# Patient Record
Sex: FEMALE | Race: BLACK OR AFRICAN AMERICAN
[De-identification: names, ages, dates, MRNs, and addresses within clinical notes are randomized per-mention and may not be internally consistent; named-entity substitution may affect disease eponyms.]

---

## 2018-02-05 ENCOUNTER — HOSPITAL ENCOUNTER (INPATIENT)
Dept: HOSPITAL 68 - ERH | Age: 73
LOS: 3 days | DRG: 377 | End: 2018-02-08
Attending: INTERNAL MEDICINE | Admitting: INTERNAL MEDICINE
Payer: COMMERCIAL

## 2018-02-05 VITALS — HEIGHT: 66 IN | BODY MASS INDEX: 28.37 KG/M2 | WEIGHT: 176.5 LBS

## 2018-02-05 VITALS — SYSTOLIC BLOOD PRESSURE: 156 MMHG | DIASTOLIC BLOOD PRESSURE: 78 MMHG

## 2018-02-05 VITALS — SYSTOLIC BLOOD PRESSURE: 160 MMHG | DIASTOLIC BLOOD PRESSURE: 90 MMHG

## 2018-02-05 DIAGNOSIS — Z79.84: ICD-10-CM

## 2018-02-05 DIAGNOSIS — I12.0: ICD-10-CM

## 2018-02-05 DIAGNOSIS — E05.90: ICD-10-CM

## 2018-02-05 DIAGNOSIS — I21.A1: ICD-10-CM

## 2018-02-05 DIAGNOSIS — E11.22: ICD-10-CM

## 2018-02-05 DIAGNOSIS — K29.71: Primary | ICD-10-CM

## 2018-02-05 DIAGNOSIS — E78.5: ICD-10-CM

## 2018-02-05 DIAGNOSIS — N18.6: ICD-10-CM

## 2018-02-05 DIAGNOSIS — Z99.2: ICD-10-CM

## 2018-02-05 LAB
APTT BLD: 37 SEC (ref 25–37)
ERYTHROCYTE [DISTWIDTH] IN BLOOD BY AUTOMATED COUNT: 17.1 % (ref 11.5–14.5)
HCT VFR BLD CALC: 37.8 % (ref 37–47)
MCH RBC QN AUTO: 28.5 PG (ref 27–31)
MCHC RBC AUTO-ENTMCNC: 32.2 G/DL (ref 33–37)
MCV RBC AUTO: 88.7 FL (ref 81–99)
PROTHROMBIN TIME: 13.9 SEC (ref 9.4–12.5)
RED BLOOD CELL CT: 4.27 /CUMM (ref 4.2–5.4)
WBC # BLD AUTO: 3.8 /CUMM (ref 4.8–10.8)

## 2018-02-05 NOTE — ADMISSION CERTIFICATION
Admission Certification
 
Certification Statement
- As attending physician, I certify that at the time of
- admission, based on clinical presentation, severity of
- symptoms, need for further diagnostic testing and
- therapeutic interventions, and risk of adverse outcomes
- without in-hospital treatment, in my clinical assessment,
- this patient requires an acute hospital stay for a minimum
- of two nights or longer. I have also considered psychsocial
- factors such as support system, advanced age, financial
- issues, cognitive issues, and failed out-patient treatments,
- past re-admission history, safety of patient, and lack of
- compliance as applicable.
Specific rationale supporting this admission is:
The patient presents with black heme positive stool c/w UGI bleed along with 
elevated troponin I level and abnormal EKG. Needs telemetry admission and will 
check serial H/H's, troponin levels. Cardiology and GI evaluations. EGD. IV 
Protonix.

## 2018-02-05 NOTE — CONS- CARDIOLOGY
General Information and HPI
 
Consulting Request
Date of Consult: 18
Requested By:
Hospitalist
 
Reason for Consult:
Positive troponin in a setting of possible GI bleeding
Source of Information: patient, old records
Exam Limitations: poor historian
History of Present Illness:
The patient is a 72-year-old female with end-stage renal disease on dialysis.  
The patient denied to me any known heart disease or a regular cardiologist.  She
presented to the emergency room after complaining of black stools and some 
stools with red blood in them.  Her workup does not show any massive GI bleeding
but a troponin was positive at 0.25 and it was recommended that she be admitted 
for both the GI bleeding and positive troponin.  She denies any chest pain or 
palpitations, dizziness, syncope.  She has chronic shortness of breath which she
claims is a little bit worse.
 
Allergies/Medications
Allergies:
Coded Allergies:
No Known Drug Allergies (Intermediate, NONE 17)
 
Home Med List:
Acetaminophen (Mapap) 325 MG TABLET   2 TAB PO Q8H PRN PAIN  (Reported)
Amlodipine Besylate 5 MG TABLET   1 TAB PO DAILY HEART  (Reported)
Carvedilol 25 MG TABLET   1 TAB PO BID HEART/BP  (Reported)
Glimepiride 1 MG TABLET   1 TAB PO DAILY AC DM  (Reported)
Levothyroxine Sodium 25 MCG TABLET   1 TAB PO DAILY THYROID  (Reported)
Losartan Potassium (Cozaar) 50 MG TABLET   1 TAB PO DAILY HEART/BP  (Reported)
Melatonin 10 MG TABLET   1 TAB PO QHS SLEEP  (Reported)
Midodrine HCl 5 MG TABLET   1 TAB PO PRN BP  (Reported)
Pravastatin Sodium 40 MG TABLET   1 TAB PO DAILY CHOLESTEROL  (Reported)
Sertraline HCl 50 MG TABLET   1 TAB PO DAILY MENTAL HEALTH  (Reported)
Sevelamer Carbonate (Renvela) 800 MG TABLET   3 TAB PO TID WM RENAL  (Reported)
 
 
Review of Systems
Review of Systems:
No other complaints at this time
 
Past History
 
Travel History
Traveled to [place] past 21 day No
 
Medical History
Neurological: NONE
EENT: NONE
Cardiovascular: hypertension
Gastrointestinal: NONE
Hepatic: NONE
Renal: ESRD on HD
Musculoskeletal: NONE
Psychiatric: NONE
Endocrine: diabetes
 
Surgical History
Surgical History: non-contributory
 
Exam & Diagnostic Data
Vital Signs and I&O
Vital Signs
 
 
Date Time Temp Pulse Resp B/P B/P Pulse O2 O2 Flow FiO2
 
     Mean Ox Delivery Rate 
 
 1358 98.1 100 20 172/102  98 Room Air  
 
 1356      96   
 
 1215 98.0 98 20 210/130  89   
 
 
 Intake & Output
 
 
  1600  08 0000  1600  08 0000
 
Intake Total 0     
 
Output Total      
 
Balance 0     
 
       
 
Intake, Oral 0     
 
 
 
Physical Exam:
Elderly-appearing black female in no acute distress
HEENT exam normal
Neck veins not distended
Carotids normal
Chest reveals some mild expiratory wheezing
Heart regular rhythm no murmurs
Extremities no edema
Labs/Daniel Results:
 Laboratory Tests
 
 
 
 
 1220
 
Chemistry 
 
  Sodium (137 - 145 mmol/L) 142
 
  Potassium (3.5 - 5.1 mmol/L) 3.8
 
  Chloride (98 - 107 mmol/L) 103
 
  Carbon Dioxide (22 - 30 mmol/L) 21  L
 
  Anion Gap (5 - 16) 18  H
 
  BUN (7 - 17 mg/dL) 15
 
  Creatinine (0.5 - 1.0 mg/dL) 6.5 *H
 
  Estimated GFR (>60 ml/min) 6  L
 
  BUN/Creatinine Ratio (7 - 25 %) 2.3  L
 
  Glucose (65 - 99 mg/dL) 127  H
 
  Calcium (8.4 - 10.2 mg/dL) 9.1
 
  Total Bilirubin (0.2 - 1.3 mg/dL) 1.2
 
  AST (14 - 36 U/L) 39  H
 
  ALT (9 - 52 U/L) 32
 
  Alkaline Phosphatase (<127 U/L) 81
 
  Troponin I (< 0.11 ng/ml) 0.25 *H
 
  Total Protein (6.3 - 8.2 g/dL) 7.3
 
  Albumin (3.5 - 5.0 g/dL) 4.0
 
  Globulin (1.9 - 4.2 gm/dL) 3.3
 
  Albumin/Globulin Ratio (1.1 - 2.2 %) 1.2
 
Coagulation 
 
  PT (9.4 - 12.5 SEC) 13.9  H
 
  INR (0.90 - 1.19) 1.33  H
 
  APTT (25 - 37 SEC) 37
 
Hematology 
 
  CBC w Diff MAN DIFF ORDERED
 
  WBC (4.8 - 10.8 /CUMM) 3.8  L
 
  RBC (4.20 - 5.40 /CUMM) 4.27
 
  Hgb (12.0 - 16.0 G/DL) 12.2
 
  Hct (37 - 47 %) 37.8
 
  MCV (81.0 - 99.0 FL) 88.7
 
  MCH (27.0 - 31.0 PG) 28.5
 
  MCHC (33.0 - 37.0 G/DL) 32.2  L
 
  RDW (11.5 - 14.5 %) 17.1  H
 
  Plt Count (/CUMM)   
 
  Segmented Neutrophils (42.2 - 75.2 %) 53
 
  Band Neutrophils (0.0 - 5.0 %) 1
 
  Lymphocytes (20.5 - 51.1 %) 36
 
  Monocytes (1.7 - 9.3 %) 10  H
 
  Anisocytosis 1+
 
 
 
 
Diagnostic Data
EKG Results
Sinus tachycardia, rate 102, probable LVH, left axis deviation, nonspecific T-
wave changes.
CXR Results
PATIENT: COOKS,LUCY  MEDICAL RECORD NO: [id number]
PRESENT AGE: 72  PATIENT ACCOUNT NO: [id number]
: 47  LOCATION: [facility]
ORDERING PHYSICIAN: Cristina ÁLVAREZ  
 
  SERVICE DATE: 
EXAM TYPE: RAD - XRY-PORTABLE CHEST XRAY
 
EXAMINATION:
XR PORTABLE CHEST
 
CLINICAL INFORMATION:
Shortness of breath
 
COMPARISON:
2018
 
TECHNIQUE:
Portable frontal view of the chest was obtained.
 
FINDINGS:
Lung volumes are low, further decreased from the prior study. There is hazy
opacity at the lung bases, likely a combination of atelectasis and overlying
soft tissues. Persistent cardiomegaly. Right axillary vascular stent. There
is pulmonary venous hypertension. No acute osseous abnormality seen.
 
IMPRESSION:
Cardiomegaly and pulmonary venous hypertension.
 
Low lung volumes with bibasilar atelectasis but no dense focal consolidation.
 
 
DICTATED BY: Rich Vanessa MD 
DATE/TIME DICTATED:18
:RAD.FLORES 
DATE/TIME TRANSCRIBED:18
 
CONFIDENTIAL, DO NOT COPY WITHOUT APPROPRIATE AUTHORIZATION.
 
 <Electronically signed in Other Vendor System>                                 
          
                                           SIGNED BY: Rich Vanessa MD  2349
 
 
 
 
Assessment/Plan
Assessment/Plan
The patient is a 72-year-old female who comes in with complaints of possible GI 
bleeding with black stools.  She is found to have some wheezing on examination. 
She also has a positive troponin of 0.25.  She has an abnormal EKG but it is 
probably consistent with her history of hypertension and end-stage renal 
disease.  I recommend the bring her in for serial EKGs and troponins and 
obtaining an echocardiogram.  I think this is most likely a type II myocardial 
infarction, i.e. demand ischemia, but we will reevaluate her after all the 
testing is in.  There are no plans for aggressive or interventional cardiac 
evaluation at this time.
 
 
Consult Acknowledgment
- Thank you for your consult request.

## 2018-02-05 NOTE — ED GENERAL ADULT
History of Present Illness
 
General
Chief Complaint: General Adult
Stated Complaint: SIB MD FOR "BLACK STOOL"
Source: patient, family
Exam Limitations: no limitations
 
Vital Signs & Intake/Output
Vital Signs & Intake/Output
 Vital Signs
 
 
Date Time Temp Pulse Resp B/P B/P Pulse O2 O2 Flow FiO2
 
     Mean Ox Delivery Rate 
 
 1009  60  120/64     
 
 1008  60  120/64     
 
 1007  60  120/64     
 
 0713 98.2 60 20 120/64  94 Room Air  
 
 2245  96  160/90     
 
 2244  96  160/90     
 
 2244 98.2 96 24 160/90  95   
 
/ 1802 97.8 87 28 156/78  95   
 
 1733 98.2 96 16 168/88  98 Room Air Room Air 
 
 1548  98 16 170/100  98 Room Air Room Air 
 
 1358 98.1 100 20 172/102  98 Room Air  
 
 1356      96   
 
 
 ED Intake and Output
 
 
  0000  1200
 
Intake Total 400 
 
Output Total  
 
Balance 400 
 
   
 
Intake,  
 
Intake, Oral 0 
 
Patient 185 lb 
 
Weight  
 
 
 
Allergies
Coded Allergies:
No Known Drug Allergies (Intermediate, NONE 17)
 
Reconcile Medications
Acetaminophen (Mapap) 325 MG TABLET   2 TAB PO Q8H PRN PAIN  (Reported)
Amlodipine Besylate 5 MG TABLET   1 TAB PO DAILY HEART  (Reported)
Carvedilol 25 MG TABLET   1 TAB PO BID HEART/BP  (Reported)
Glimepiride 1 MG TABLET   1 TAB PO DAILY AC DM  (Reported)
Levothyroxine Sodium 25 MCG TABLET   1 TAB PO DAILY THYROID  (Reported)
Losartan Potassium (Cozaar) 50 MG TABLET   1 TAB PO DAILY HEART/BP  (Reported)
Melatonin 10 MG TABLET   1 TAB PO QHS SLEEP  (Reported)
Midodrine HCl 5 MG TABLET   1 TAB PO PRN BP  (Reported)
Pravastatin Sodium 40 MG TABLET   1 TAB PO DAILY CHOLESTEROL  (Reported)
Sertraline HCl 50 MG TABLET   1 TAB PO DAILY MENTAL HEALTH  (Reported)
Sevelamer Carbonate (Renvela) 800 MG TABLET   3 TAB PO TID WM RENAL  (Reported)
 
Triage Note:
PER PT SINCE YESTERDAY BLACK STOOLS AND
 LIGHTHEADED  NO CO NAUSEA PER PT NOT ON BLOOD
 THINNERS
Triage Nurses Notes Reviewed? yes
Onset: Gradual
Duration: day(s): (2)
Timing: no prior history
Injury Environment: home
Severity: moderate
No Modifying Factors: none
Associated Symptoms: SOB
HPI:
Patient is a 72-year-old female with chronic renal failure, hypertension and 
diabetes presenting to the emergency department to complaint of black stools 3 
episodes since yesterday evening.  She called her primary care physician who 
instructed her to come to the emergency department for evaluation.  She reports 
associated lightheadedness.  Positive shortness of breath which according to the
patient and  has been "chronic".  Shortness of breath has been going on 
for "months".  Worse with exertion.  Patient denies any chest pain or 
palpitations.  Patient denying any changes in medications.  Her last dialysis 
appointment was 2 days ago.  She is due for dialysis tomorrow.  Denies any 
weakness or fatigue.  No history of similar symptoms in the past.  She has never
had a colonoscopy or endoscopy in the past.  Denies use of Motrin.  No nausea or
abdominal pain.
(Cristina Marley)
 
Past History
 
Travel History
Traveled to [place] past 21 day No
 
Medical History
Any Pertinent Medical History? see below for history
Neurological: NONE
EENT: NONE
Cardiovascular: hypertension
Gastrointestinal: NONE
Hepatic: NONE
Renal: ESRD on HD
Musculoskeletal: NONE
Psychiatric: NONE
Endocrine: diabetes
 
Surgical History
Surgical History: non-contributory
 
Psychosocial History
What is your primary language English
Tobacco Use: Never used
 
Family History
Hx Contributory? No
(Cristina Marley)
 
Review of Systems
 
Review of Systems
Constitutional:
Reports: no symptoms. 
Comments
Review of systems: See HPI, All other systems negative.
Constitutional, no chills fever or weight loss
HEENT: No visual changes no sore throat no congestion
Cardiovascular: No chest pain ,palpitation , orthopnea or ankle swelling
Skin, no jaundice no rashes
Respiratory: No dyspnea cough sputum or hemoptysis
GI: No nausea no vomiting
: No dysuria No hematuria
Muscle skeletal: no back pain, no neck pain,
Neurologic: No numbness no confusion, no headaches
Psych: No stress anxiety or depression,.
Heme/endocrine: No bruising no bleeding no polyuria or polydipsia
Immunology: No splenectomy or history of AIDS
(Cristina Marley)
 
Physical Exam
 
Physical Exam
General Appearance: well developed/nourished, no apparent distress, alert, awake
, comfortable
Comments:
Well-developed well-nourished person in no acute distress
HEENT:  Pupils equally round and reactive to light and accommodation. ? CATARACT
NOTED BILATERALLY.Nose is atraumatic. External auditory canal and Tympanic 
membranes clear. Pharynx normal. No swelling or edema.  Dry oral mucosa.
Neck: Supple, no lymphadenopathy, normal range of motion without pain or 
tenderness
Back: Nontender, no CVA tenderness. Full range of motion
Cardiovascular: Regular rate and rhythms no murmurs rubs or gallops, normal JVP
Respiratory: Chest nontender. No respiratory distress.breath sounds clear to 
auscultation bilaterally
Abdomen: Soft, nontender nondistended, no appreciable organomegaly. Normal bowel
sounds. No ascites,, no rebound or guarding.
Rectal: Green stool, trace positive guaiac.  External hemorrhoid noted in the 3 
o'clock position approximately 1 cm in size, nonthrombosed.
Extremity: No edema, no calf tenderness to palpation, normal and equal pulses.
Neuro: Alert oriented x3, motor sensory normal, cranial nerves II through XII 
grossly intact.  Cerebellar testing is unremarkable.
Skin: No appreciable rash on exposed skin, skin is warm and dry.
Psych: Mood and affect is normal, memory and judgment is normal.
 
Core Measures
ACS in differential dx? Yes
CVA/TIA Diagnosis: No
Sepsis Present: No
Sepsis Focused Exam Completed? No
(Adela ÁLVAREZ,Cristina)
 
Progress
Differential Diagnoses
I considered the following diagnoses in my evaluation of the patient:  Upper GI 
bleed, lower GI bleed, diverticulitis, electrolyte abnormality, symptomatic 
anemia, orthostatic hypotension, ACS, ENTERITIS
 
Plan of Care:
 Orders
 
 
Procedure Date/time Status
 
Nothing by Mouth  B Active
 
CBC WITHOUT DIFFERENTIAL  0600 Active
 
BASIC ELECTROLYTES PLUS BUN&CR  0600 Complete
 
TROPONIN LEVEL  0500 Complete
 
EKG  0500 Active
 
ECHOCARDIOGRAM   UNK Active
 
Renal Dialysis Diet  D Complete
 
TROPONIN LEVEL  2300 Complete
 
EKG  2300 Active
 
Vital Signs  180 Complete
 
Teach/Educate  180 Active
 
Pain Treatment and Response  180 Active
 
Nutritional Intake, Monitor  180 Active
 
Isolation  180 Active
 
Intake & Output  180 Complete
 
Patient Care Conference  180 Active
 
Activity/Ambulation  180 Active
 
Pathway - chart  175 Active
 
Weight  175 Active
 
TROPONIN LEVEL  175 Complete
 
EKG  1758 Active
 
Code Status  1758 Active
 
ED Holding Orders  1506 Active
 
Admit to inpatient  1506 Active
 
Code Status  1506 Complete
 
Patient Data  1501 Active
 
MISTAKE  1441 Active
 
Intake & Output  1356 Active
 
Telemetry/Cardiac Monitor  1354 Active
 
House Staff   UNK Active
 
VTE Mechanical Prophylaxis   UNK Active
 
Vital Signs   UNK Active
 
Intake & Output   UNK Complete
 
Hemoccult   UNK Active
 
FingerStick- Glucose   UNK Active
 
 
 Current Medications
 
 
  Sig/Misty Start time  Last
 
Medication Dose  Stop Time Status Admin
 
Pravastatin Sodium 20 MG 1700  1700 AC 
 
(Pravachol)     
 
Epoetin Jw 4,000 UNIT PER PROTOCL PRN  1030 AC 
 
(Epogen Inj 4000      
 
(DIALYSIS PATIENT))     
 
Paricalcitol 1 MCG PER PROTOCL PRN  1030 AC 
 
(Zemplar Inj. 2MCG/     
 
ML)     
 
Losartan Potassium 50 MG DAILY  1000 AC 
 
(Cozaar)     1007
 
Insulin Human Regular 0 Q6  2359 AC 
 
(NovoLIN R)     
 
Carvedilol 25 MG BID  2200 AC 
 
(Coreg)     1009
 
Melatonin 10 MG AT BEDTIME  220 AC 
 
(Melatonin)     2244
 
Acetaminophen 650 MG Q6P PRN  1800 AC 
 
(Tylenol)     
 
Acetaminophen 650 MG Q8H PRN  1800 CAN 
 
(Tylenol)     
 
Dextrose/Sodium  1,000 ML Q20H / 1800 AC 
 
Chloride    0959  1839
 
(D5W-1/2 Normal      
 
Saline 1000ML)     
 
Levothyroxine Sodium 0.025 MG DAILY  1800 AC 
 
(Synthroid)     1008
 
Midodrine 5 MG DAILY PRN  1800 AC 
 
(Pro-Amatine)     
 
Sevelamer Carbonate 2,400 MG WM  1800 AC 
 
(Renvela)     1857
 
Amlodipine Besylate 5 MG DAILY  1759 AC 
 
(Norvasc)     1008
 
Sertraline HCl 50 MG DAILY  1759 AC 
 
(Zoloft)     1008
 
Pantoprazole Sodium 40 MG DAILY  1756 AC 
 
(Protonix)     1007
 
 
 Laboratory Tests
 
 
 
18 1005:
WBC Pending, RBC Pending, Hgb Pending, Hct Pending, MCV Pending, MCH Pending, 
MCHC Pending, RDW Pending, Plt Count Pending, MPV Pending, Gran % Pending, 
Lymphocytes % Pending, Monocytes % Pending, Eosinophils % Pending, Basophils % 
Pending, Absolute Granulocytes Pending, Absolute Lymphocytes Pending, Absolute 
Monocytes Pending, Absolute Eosinophils Pending, Absolute Basophils Pending
 
18 0515:
Troponin I 0.27 *H
 
18 0515:
Anion Gap 16, Estimated GFR 5  L, BUN/Creatinine Ratio 2.3  L
 
18 2315:
Troponin I 0.30 *H
 
18 1836:
Troponin I 0.25 *H
 
 
D/W Covering cardiology, Dr. Mascorro, recommending we trend troponins and EKGs.
 Hold off on anticoagulation.  Likely demand ischemia secondary to elevated 
creatinine.  Patient will be admitted to medicine to telemetry for elevated 
troponin.
Diagnostic Imaging:
Viewed by Me: Radiology Read.  Discussed w/RAD: Radiology Read. 
Radiology Impression: PATIENT: COOKS,LUCY  MEDICAL RECORD NO: [id number] PRESENT AGE
: 72  PATIENT ACCOUNT NO: [id number] : 47  LOCATION: [facility] ORDERING 
PHYSICIAN: Cristina ÁLVAREZ     SERVICE DATE:  EXAM TYPE: RAD - 
XRY-PORTABLE CHEST XRAY EXAMINATION: XR PORTABLE CHEST CLINICAL INFORMATION: 
Shortness of breath COMPARISON: 2018 TECHNIQUE: Portable frontal view of 
the chest was obtained. FINDINGS: Lung volumes are low, further decreased from 
the prior study. There is hazy opacity at the lung bases, likely a combination 
of atelectasis and overlying soft tissues. Persistent cardiomegaly. Right 
axillary vascular stent. There is pulmonary venous hypertension. No acute 
osseous abnormality seen. IMPRESSION: Cardiomegaly and pulmonary venous 
hypertension. Low lung volumes with bibasilar atelectasis but no dense focal 
consolidation. DICTATED BY: Rich Vanessa MD  DATE/TIME DICTATED:18 :RAD.FLORES  DATE/TIME TRANSCRIBED:18 
CONFIDENTIAL, DO NOT COPY WITHOUT APPROPRIATE AUTHORIZATION.  <Electronically 
signed in Other Vendor System>                           , PATIENT: COOKS,LUCY  
MEDICAL RECORD NO: [id number] PRESENT AGE: 72  PATIENT ACCOUNT NO: [id number] :   LOCATION: [facility] ORDERING PHYSICIAN: Cristina ÁLVAREZ     SERVICE DATE: 
 EXAM TYPE: RAD - XRY-PORTABLE CHEST XRAY EXAMINATION: XR PORTABLE 
CHEST CLINICAL INFORMATION: Shortness of breath COMPARISON: 2018 TECHNIQUE
: Portable frontal view of the chest was obtained. FINDINGS: Lung volumes are 
low, further decreased from the prior study. There is hazy opacity at the lung 
bases, likely a combination of atelectasis and overlying soft tissues. 
Persistent cardiomegaly. Right axillary vascular stent. There is pulmonary 
venous hypertension. No acute osseous abnormality seen. IMPRESSION: Cardiomegaly
and pulmonary venous hypertension. Low lung volumes with bibasilar atelectasis 
but no dense focal consolidation. DICTATED BY: Rich Vanessa MD  DATE/TIME 
DICTATED:18 :RAD.FLORES  DATE/TIME TRANSCRIBED:1435 CONFIDENTIAL, DO NOT COPY WITHOUT APPROPRIATE AUTHORIZATION.  <
Electronically signed in Other Vendor System>                                   
                                                    SIGNED BY: Rich Vanessa MD 18 1440
Initial ED EKG: SINUS TACHY
(Cristina Marley)
 
Departure
 
Departure
Time of Disposition: 1547
Disposition: STILL A PATIENT
Condition: Stable
Clinical Impression
Primary Impression: Elevated troponin
Secondary Impressions: 
Guaiac positive stools
 
Hypertension
Qualifiers:  Hypertension type: essential hypertension Qualified Code: I10 - 
Essential (primary) hypertension
 
Referrals:
Ranulfo Rossi MD (PCP/Family)
 
Departure Forms:
Customer Survey
General Discharge Information
 
Admission Note
Spoke With:
Walter Moffett MD
Documentation of Exam:
Documentation of any treatments & extenuating circumstances including Concerns 
Regarding Discharge (functional status, medication knowledge or non-compliance, 
living conditions, etc.) that warrant an admission rather than observation:  
Patient requiring telemetry admission for positive troponin, serial EKGs and 
serial troponins, cardiology consultation, patient will require routine dialysis
scheduled for tomorrow, GI consultation, may need endoscopy and colonoscopy, 
TrEND H&H.
(Cristina Marley)
 
PA/NP Co-Sign Statement
Statement:
ED Attending supervision documentation-
 
[X] I saw and evaluated the patient. I have also reviewed all the pertinent lab 
results and diagnostic results. I agree with the findings and the plan of care 
as documented in the PA's/NP's documentation. 
 
[X] I have reviewed the ED Record and agree with the PA's/NP's documentation.
 
[] Additions or exceptions (if any) to the PAs/NP's note and plan are 
summarized below:
[]
 
(Nirav GONZALES,Eveline)
 
Critical Care Note
 
Critical Care Note
Critical Care Time: 30-74 min
(Adela ÁLVAREZ,Cristina)

## 2018-02-05 NOTE — HISTORY & PHYSICAL
Ariadna Abraham MD,[facility] 02/05/18 1516:
General Information and HPI
MD Statement:
I have seen and personally examined COOKS,LUCY and documented this H&P.
 
The patient is a 72 year old F who presented with a patient stated chief 
complaint of [black stool].
 
Source of Information: patient, family
History of Present Illness:
Patient is a 72-year-old female with PMH of ESRD on HD (Tues/Thurs/Sat), HTN, DM
, hypothyroidism presented to ED was referred from her PCPs office of evalaution
of black stool and lightheadedness.  Son and  were present at the bedside
but had little information regarding her health condition.
 
According to patient she was in usual state of health until last night that she 
had one black bowel movement.  She again had another black bowel movement this 
morning and planned to go to her PCP (she noted Dr. Gastelum); her PCP then 
referred her to hospital for further evaluation.  She also reported 
lightheadedness, shortness of breathing in walking short distances, but denied 
any chest pain, nausea or vomiting.  She denied taking any anticoagulation or 
aspirin and noted she skipped some of her medication.
 
Patient lives with her , her granddaughter takes care of her medication, 
and denied smoking or drinking alcohol.  She reported a fall 2 weeks ago, after 
which she recommended to use a crane for ambulation.
 
Allergies/Medications
Allergies:
Coded Allergies:
No Known Drug Allergies (Intermediate, NONE 11/02/17)
 
Home Med list
Acetaminophen (Mapap) 325 MG TABLET   2 TAB PO Q8H PRN PAIN  (Reported)
Amlodipine Besylate 5 MG TABLET   1 TAB PO DAILY HEART  (Reported)
Carvedilol 25 MG TABLET   1 TAB PO BID HEART/BP  (Reported)
Glimepiride 1 MG TABLET   1 TAB PO DAILY AC DM  (Reported)
Levothyroxine Sodium 25 MCG TABLET   1 TAB PO DAILY THYROID  (Reported)
Losartan Potassium (Cozaar) 50 MG TABLET   1 TAB PO DAILY HEART/BP  (Reported)
Melatonin 10 MG TABLET   1 TAB PO QHS SLEEP  (Reported)
Midodrine HCl 5 MG TABLET   1 TAB PO PRN BP  (Reported)
Pravastatin Sodium 40 MG TABLET   1 TAB PO DAILY CHOLESTEROL  (Reported)
Sertraline HCl 50 MG TABLET   1 TAB PO DAILY MENTAL HEALTH  (Reported)
Sevelamer Carbonate (Renvela) 800 MG TABLET   3 TAB PO TID WM RENAL  (Reported)
 
 
Past History
 
Travel History
Traveled to [place] past 21 day No
 
Medical History
Neurological: NONE
EENT: NONE
Cardiovascular: hypertension
Gastrointestinal: NONE
Hepatic: NONE
Renal: ESRD on HD
Musculoskeletal: NONE
Psychiatric: NONE
Endocrine: diabetes
 
Surgical History
Surgical History: non-contributory
 
Review of Systems
 
Review of Systems
Constitutional:
Reports: see HPI. 
 
Exam & Diagnostic Data
Last 24 Hrs of Vital Signs/I&O
 Vital Signs
 
 
Date Time Temp Pulse Resp B/P B/P Pulse O2 O2 Flow FiO2
 
     Mean Ox Delivery Rate 
 
02/05 1802 97.8 87 28 156/78  95   
 
02/05 1733 98.2 96 16 168/88  98 Room Air Room Air 
 
02/05 1548  98 16 170/100  98 Room Air Room Air 
 
02/05 1358 98.1 100 20 172/102  98 Room Air  
 
02/05 1356      96   
 
02/05 1215 98.0 98 20 210/130  89   
 
 
 Intake & Output
 
 
 02/05 1600 02/05 0800 02/05 0000
 
Intake Total 0  
 
Output Total   
 
Balance 0  
 
    
 
Intake, Oral 0  
 
 
 
 
Physical Exam
General Appearance Alert, Oriented X3, Cooperative, No Acute Distress
Skin No Significant Lesion
Skin Temp/Moisture Exam: Warm/Dry
Sepsis Skin Exam (color): Normal for Ethnicity
HEENT Atraumatic, EOMI, Mucous Membr. moist/pink
Cardiovascular Regular Rate, Normal S1, Normal S2, No Murmurs
Lungs Clear to Auscultation
Abdomen Soft, mild epigastric tenderness in deep palpation
Neurological Normal Speech, Strength at 5/5 X4 Ext
Extremities No Edema, hemodialysis fistula present in right arm
Last 24 Hrs of Labs/Daniel:
 Laboratory Tests
 
02/05/18 1836:
Troponin I Pending
 
02/05/18 1220:
Anion Gap 18  H, Estimated GFR 6  L, BUN/Creatinine Ratio 2.3  L, Glucose 127  H
, Calcium 9.1, Total Bilirubin 1.2, AST 39  H, ALT 32, Alkaline Phosphatase 81, 
Troponin I 0.25 *H, Total Protein 7.3, Albumin 4.0, Globulin 3.3, Albumin/
Globulin Ratio 1.2, PT 13.9  H, INR 1.33  H, APTT 37, CBC w Diff MAN DIFF 
ORDERED, RBC 4.27, MCV 88.7, MCH 28.5, MCHC 32.2  L, RDW 17.1  H, Segmented 
Neutrophils 53, Band Neutrophils 1, Lymphocytes 36, Monocytes 10  H, 
Anisocytosis 1+
 
 
Assessment/Plan
Assessment:
72 Y F, presented with black BM x2 and lightheadedness
 
PMH of ESRD on HD (Tues/Thurs/Sat), HTN, DM, hypothyroidism
 
VS, Ph Ex at admission:
 over 130, NV 98, RR 20, no fever, later /102
Labs at admission:
C BC: Hgb 12.2, WBC 3.8, PLT was not contacted, INR 1.3, BEP, CR 6.5, AST 39, 
troponin 0.25
Imagings at admission:
CXR:
Cardiomegaly and pulmonary venous hypertension.
Low lung volumes with bibasilar atelectasis but no dense focal consolidation.
 
Patient was admitted to telemetry floor for management of following conditions:
 
GIB
Likely upper GI bleeding considering black bowel movement which indicates 
presence of blood for several hours.  However patient had no hematemesis that is
relatively indicative of upper GI bleeding.  At this point the source could be 
upper or lower GI bleeding as clinical presentation doesn't point to the source,
yet upper is more likely as noted above.
We will admit patient to telemetry floor and is scheduled for an upper GI 
endoscopy tomorrow morning.
-Admit patient to telemetry floor
-Vital sign, I/O
-2 large IV line all the time
-CBC in AM
- NPO tonight
- GI consult for possible endoscopy tomorrow
 
ESRD on HD
Patient has a history of end-stage renal disease on hemodialysis on Tuesday, 
Thursday, Saturday.  We will consult nephrology and continue the same schedule.
-Nephrology consult
-Hemodialysis tomorrow
 
Increased troponins
This could be secondary to an decisional disease, however we will continue to 
trend down to rule out ACS.  Patient doesn't have any symptoms at this point.
-Serial EKG and troponin
-Cardiology consult
-Echo
 
Full code
Dialysis diet
DVT PPX: ALPS
  
 
As Ranked By This Provider
Problem List:
 1. ESRD (end stage renal disease)
 
 2. GIB (gastrointestinal bleeding)
 
 
Core Measures/Misc (9/17)
 
Acute Coronary Syndrome
ACS Diagnosis: No
 
Congestive Heart Failure
Congestive Heart Failure Diagnosis No
 
Cerebrovascular Accident
CVA/TIA Diagnosis: No
 
VTE (View Protocol)
VTE Risk Factors Age>40
No Mechanical VTE Prophylaxis d/t N/A MechProphylax Ordered
No VTE Pharm Prophylaxis d/t Medical Contraindication
 
Sepsis (View protocol)
Sepsis Present: No
 
 
Walter Moffett MD 02/05/18 1824:
Attending MD Review Statement
 
Attending Statement
Attending MD Statement: examined this patient, discuss w/resident/PA/NP, agreed 
w/resident/PA/NP, discussed with family, reviewed EMR data (avail), reviewed 
images, amended to note
Attending Assessment/Plan:
The patient is a 73 yo female with h/o ESRD on dialysis (qTu/Th/Sa), HTN,  DM2, 
HL, & hypothyroid who presented in the ED on the day of admission with noted 
"black stool". She also noted some lightheadedness and dyspnea (worse with 
exertion). She denied any chest pain, vomiting , nausea, hematemesis, fevers, 
abdominal pain, or other symptoms. No h/o bleeding previously. Has had 
colonoscopy before (no EGD).
 
Physical Exam:
VS: T 98.0, P 98, R 20, /130-172/102, PO 98% RA
HEENT: eyes- PERRLA, EOMI
           jane- moist mucosa, no lesions
Neck: no adenopathy, JVD, bruits
Chest: minimal expiratory wheeze
Cor: RRR nl S1, S2 w/o murm
Abd: BS+, soft, NT, - HSM
Ext: no edema, pulses 1+
Neuro: alert & oriented x 3, non-focal exam
 
Labs/Tests- as above
 
Impression/Plan:
#Acute Gastrointestinal Bleeding- black stool noted c/w UGI source. Possible 
gastritis, PUD, DU, angiodysplasia, etc. No h/o UGI bleeding in past. 
Plan: Admit to telemetry bed - check serial H/H's, monitor stool for bleeding, 
orthostatics.
       GI consult- Dr. Mascorro. IV PPI/protonix.
       Plan for EGD tomorrow. 
 
#HTN- patient with increased BP noted, however most likely did not take her meds
today.
Plan: Agree to give po Amlodipine with sip of water and add Carvedilol/Losartan 
pending BP.
 
#Abnormal EKG/Elevated Troponin I Level.- has T wave inversions in lateral limb 
leads and NSSTT changes similar to prior tracing. Troponin most likely elevated 
due to chronic renal failure. Need to exclude type II MI.
Plan: Will monitor on telemetry and check serial troponin levels. 
        Cardiology consult with Dr. Mascorro appreciated. 
 
#DM2- on Glimepiride at home.
Plan: Follow glucose levels and sliding scale insulin at present.
 
#Hypothyroid- on Levothyroxine.
Plan: Continue Levothyroxine.
 
#HL- on Pravastatin.
Plan: Hold at present. 
 
#Depression- on Sertraline.
Plan: Hold at present while NPO.
 
 
 
Anand Moralez 02/05/18 1841:
Resident Review Statement
Resident Statement: examined this patient, discussed with intern, agreed with 
intern, discussed with family, reviewed EMR data (avail), reviewed images, 
amended to note
Other Findings:
72-year-old woman with end-stage renal disease on dialysis Tuesday, Thursday and
Saturday, no known heart disease, doesn't see any cardiologist presented to the 
ED after experiencing dark stools since yesterday.  She went to see her primary 
care physician this morning, who advised that the patient goes to the ED.  She 
also experienced some epigastric pain, shortness of breath and lightheadedness/ 
tiredness.  The patient will be admitted to telemetry floor for a positive 
troponin and for further workup of her GI bleeding.  Her EKG was reviewed, which
shows abnormality likely related to her long-standing hypertension.  Obtain 
serial troponins and EKG to rule out a MI, this likely is demand ischemia in the
setting of end-stage renal disease.  For the GI bleed, large-bore IV, careful 
fluid hydration given end-stage renal disease (obtain Nephro consult), GI 
evaluation, IV Protonix.  Sliding scale for diabetes.  Continue with the 
medications.
Full code.  Nothing by mouth.  Only mechanical DVT prophylaxis.

## 2018-02-05 NOTE — RADIOLOGY REPORT
EXAMINATION:
XR PORTABLE CHEST
 
CLINICAL INFORMATION:
Shortness of breath
 
COMPARISON:
01/13/2018
 
TECHNIQUE:
Portable frontal view of the chest was obtained.
 
FINDINGS:
Lung volumes are low, further decreased from the prior study. There is hazy
opacity at the lung bases, likely a combination of atelectasis and overlying
soft tissues. Persistent cardiomegaly. Right axillary vascular stent. There
is pulmonary venous hypertension. No acute osseous abnormality seen.
 
IMPRESSION:
Cardiomegaly and pulmonary venous hypertension.
 
Low lung volumes with bibasilar atelectasis but no dense focal consolidation.

## 2018-02-06 VITALS — SYSTOLIC BLOOD PRESSURE: 120 MMHG | DIASTOLIC BLOOD PRESSURE: 64 MMHG

## 2018-02-06 VITALS — SYSTOLIC BLOOD PRESSURE: 124 MMHG | DIASTOLIC BLOOD PRESSURE: 64 MMHG

## 2018-02-06 VITALS — SYSTOLIC BLOOD PRESSURE: 118 MMHG | DIASTOLIC BLOOD PRESSURE: 70 MMHG

## 2018-02-06 LAB
ABSOLUTE GRANULOCYTE CT: 1.2 /CUMM (ref 1.4–6.5)
BASOPHILS # BLD: 0 /CUMM (ref 0–0.2)
BASOPHILS NFR BLD: 0 % (ref 0–2)
EOSINOPHIL # BLD: 0 /CUMM (ref 0–0.7)
EOSINOPHIL NFR BLD: 0.3 % (ref 0–5)
ERYTHROCYTE [DISTWIDTH] IN BLOOD BY AUTOMATED COUNT: 17.1 % (ref 11.5–14.5)
ERYTHROCYTE [DISTWIDTH] IN BLOOD BY AUTOMATED COUNT: 18 % (ref 11.5–14.5)
GRANULOCYTES NFR BLD: 44.5 % (ref 42.2–75.2)
HCT VFR BLD CALC: 35 % (ref 37–47)
HCT VFR BLD CALC: 35.6 % (ref 37–47)
LYMPHOCYTES # BLD: 1 /CUMM (ref 1.2–3.4)
MCH RBC QN AUTO: 28.6 PG (ref 27–31)
MCH RBC QN AUTO: 28.8 PG (ref 27–31)
MCHC RBC AUTO-ENTMCNC: 32.1 G/DL (ref 33–37)
MCHC RBC AUTO-ENTMCNC: 32.7 G/DL (ref 33–37)
MCV RBC AUTO: 88.1 FL (ref 81–99)
MCV RBC AUTO: 89.2 FL (ref 81–99)
MONOCYTES # BLD: 0.5 /CUMM (ref 0.1–0.6)
PMV BLD AUTO: 10.9 FL (ref 7.4–10.4)
RED BLOOD CELL CT: 3.98 /CUMM (ref 4.2–5.4)
RED BLOOD CELL CT: 3.99 /CUMM (ref 4.2–5.4)
WBC # BLD AUTO: 2.7 /CUMM (ref 4.8–10.8)
WBC # BLD AUTO: 3.6 /CUMM (ref 4.8–10.8)

## 2018-02-06 PROCEDURE — 5A1D70Z PERFORMANCE OF URINARY FILTRATION, INTERMITTENT, LESS THAN 6 HOURS PER DAY: ICD-10-PCS

## 2018-02-06 NOTE — PN- HOUSESTAFF
**See Addendum**
Subjective
Follow-up For:
GI bleeding
ESRD on HD
increased TN
Tele-Events Since Last Visit:
SR 57-69, PVC, bigeminy trigeminy
Subjective:
Patient visited today, old lady, was lying in bed comfortably in no acute 
distress, was alert and oriented.
 
Had 1 big loose bowel movement this morning which was guaiac negative.
No fever or chills, no shortness of breathing, no chest pain, no other events.
Plan to go for hemodialysis today, endoscopy was deferred for tomorrow.  Patient
will be nothing by mouth tonight.
 
Review of Systems
Constitutional:
Reports: see HPI. 
 
Objective
Last 24 Hrs of Vital Signs/I&O
 Vital Signs
 
 
Date Time Temp Pulse Resp B/P B/P Pulse O2 O2 Flow FiO2
 
     Mean Ox Delivery Rate 
 
 1623 98.7 56 20 124/64  96 Room Air  
 
 1009  60  120/64     
 
 1008  60  120/64     
 
 1007  60  120/64     
 
 0713 98.2 60 20 120/64  94 Room Air  
 
 2245  96  160/90     
 
 2244  96  160/90     
 
 2244 98.2 96 24 160/90  95   
 
 
 Intake & Output
 
 
  1600 /06 0800  0000
 
Intake Total 30 350 400
 
Output Total  1 
 
Balance 30 349 400
 
    
 
Intake, IV  350 400
 
Intake, Oral 30 0 0
 
Output, Stool  1 
 
Patient 165 lb  185 lb
 
Weight   
 
Weight Bed scale  
 
Measurement   
 
Method   
 
 
 
 
Physical Exam
General Appearance: Alert, Oriented X3, Cooperative, No Acute Distress
Skin: No Significant Lesion
Skin Temp/Moisture Exam: Warm/Dry
Sepsis Skin Exam (color): Normal for Ethnicity
HEENT: Atraumatic, EOMI, Mucous Membr. moist/pink
Cardiovascular: Regular Rate, Normal S1, Normal S2
Lungs: Clear to Auscultation
Abdomen: Normal Bowel Sounds, Soft, No Tenderness
Neurological: Normal Speech
Extremities: no edema, dialysis fistula in the right arm
Current Medications:
 Current Medications
 
 
  Sig/Misty Start time  Last
 
Medication Dose Route Stop Time Status Admin
 
Acetaminophen 650 MG Q6P PRN  1800 AC 
 
  PO   
 
Acetaminophen 650 MG Q8H PRN  1800 CAN 
 
  PO   
 
Amlodipine Besylate 5 MG DAILY  1759 AC 
 
  PO   1008
 
Carvedilol 25 MG BID  2200 AC 
 
  PO   1009
 
Dextrose/Sodium  1,000 ML Q20H  1800 DC 
 
Chloride  IV  0959  1839
 
Epoetin Jw 4,000 UNIT PER PROTOCL PRN  1030 AC 
 
  IV   
 
Insulin Aspart 0 TIDAC  1700 AC 
 
  SC  0000  
 
Insulin Human Regular 0 Q6  2359 AC 
 
  SC   
 
Insulin Human Regular 0 Q6  2359 DC 
 
  SC   
 
Levothyroxine Sodium 0.025 MG DAILY  1800 AC 
 
  PO   1008
 
Losartan Potassium 50 MG DAILY  1000 AC 
 
  PO   1007
 
Melatonin 10 MG AT BEDTIME  2200 AC 
 
  PO   2244
 
Midodrine 5 MG DAILY PRN  1800 AC 
 
  PO   
 
Pantoprazole Sodium 40 MG BID  220 AC 
 
  IV   
 
Pantoprazole Sodium 40 MG DAILY  1756 DC 
 
  IV   1007
 
Paricalcitol 1 MCG PER PROTOCL PRN  1030 AC 
 
  IV   
 
Pravastatin Sodium 20 MG 1700  1700 AC 
 
  PO   
 
Sertraline HCl 50 MG DAILY  1759 AC 
 
  PO   1008
 
Sevelamer Carbonate 2,400 MG WM  1800 AC 
 
  PO   1857
 
 
 
 
Last 24 Hrs of Lab/Daniel Results
Last 24 Hrs of Labs/Mics:
 Laboratory Tests
 
18 1240:
Anion Gap 15, Estimated GFR 5  L, BUN/Creatinine Ratio 2.8  L, Calcium 8.3  L, 
CBC w Diff MAN DIFF ORDERED, RBC 3.99  L, MCV 89.2, MCH 28.6, MCHC 32.1  L, RDW 
18.0  H, MPV 10.9  H, Gran % 44.5, Lymphocytes % 35.0, Monocytes % 20.2  H, 
Eosinophils % 0.3, Basophils % 0, Absolute Granulocytes 1.2  L, Absolute 
Lymphocytes 1.0  L, Absolute Monocytes 0.5, Absolute Eosinophils 0, Absolute 
Basophils 0, Hypochromic-Microcytic 2+, Anisocytosis 2+
 
18 1005:
CBC w Diff MAN DIFF ORDERED, RBC 3.98  L, MCV 88.1, MCH 28.8, MCHC 32.7  L, RDW 
17.1  H, Segmented Neutrophils 49, Band Neutrophils 1, Lymphocytes 37, Monocytes
12  H, Eosinophils 1, Nucleated RBCs 1  H, Normochromic RBCs VERIFIED, 
Polychromasia   , Poikilocytosis RARE, Anisocytosis 1+
 
18 0515:
Troponin I 0.27 *H
 
18 0515:
Anion Gap 16, Estimated GFR 5  L, BUN/Creatinine Ratio 2.3  L
 
18 2315:
Troponin I 0.30 *H
 
18 1836:
Troponin I 0.25 *H
 
 
Assessment/Plan
Assessment:
72 Y F, presented with black BM x2 and lightheadedness
 
PMH of ESRD on HD (Tues/Thurs/Sat), HTN, DM, hypothyroidism
 
VS, Ph Ex at admission:
 over 130, MS 98, RR 20, no fever, later /102
Labs at admission:
C BC: Hgb 12.2, WBC 3.8, PLT was not contacted, INR 1.3, BEP, CR 6.5, AST 39, 
troponin 0.25
Imagings at admission:
CXR:
Cardiomegaly and pulmonary venous hypertension.
Low lung volumes with bibasilar atelectasis but no dense focal consolidation.
 
Patient was admitted to telemetry floor for management of following conditions:
 
GIB
Likely upper GI bleeding considering black bowel movement which indicates 
presence of blood for several hours.  However patient had no hematemesis that is
relatively indicative of upper GI bleeding.  At this point the source could be 
upper or lower GI bleeding as clinical presentation doesn't point to the source,
yet upper is more likely as noted above.
We will admit patient to telemetry floor and is scheduled for an upper GI 
endoscopy tomorrow morning.
-Continue patient to telemetry floor
-Vital sign, I/O
-2 large IV line all the time
-CBC in AM
-Patient will have supper tonight and will be nothing by mouth from midnight for
possible endoscopy tomorrow 
-IV Protonix twice a day
- possible endoscopy tomorrow
- We will hold off anti PLT according to GI
 
ESRD on HD
Patient has a history of end-stage renal disease on hemodialysis on Tuesday, 
Thursday, Saturday.  We will consult nephrology and continue the same schedule.
-Nephrology consult
-Hemodialysis today
 
Increased troponins
This could be secondary to an decisional disease, however we will continue to 
trend down to rule out ACS.  Patient doesn't have any symptoms at this point.
-Serial EKG and troponin ruled out ACS
-Follow Cardiology consult
-Follow up Echo
 
Full code
Dialysis diet
DVT PPX: ALPS
Problem List:
 1. GIB (gastrointestinal bleeding)
 
 2. ESRD (end stage renal disease)
 
Pain Ratin
Pain Location:
None
Pain Goal: Pain 4 or less
Pain Plan:
Continue
Tomorrow's Labs & Rationales:
C BC, BEP

## 2018-02-06 NOTE — PN- CARDIOLOGY
Subjective
Subjective:
The patient has no complaints this morning.  She is due for dialysis today.  She
has no chest pain.  She states she had some diarrhea but denies any black or 
bloody stool.  Her hematocrit this morning is still pending.  Her troponin 
peaked at 0.3 and is declining.  Her EKG this morning shows more prominent T-
wave inversions.  Her echocardiogram is pending.
 
Objective
Vital Signs and I&Os
Vital Signs
 
 
Date Time Temp Pulse Resp B/P B/P Pulse O2 O2 Flow FiO2
 
     Mean Ox Delivery Rate 
 
02/06 1009  60  120/64     
 
02/06 1008  60  120/64     
 
02/06 1007  60  120/64     
 
02/06 0713 98.2 60 20 120/64  94 Room Air  
 
02/05 2245  96  160/90     
 
02/05 2244  96  160/90     
 
02/05 2244 98.2 96 24 160/90  95   
 
02/05 1802 97.8 87 28 156/78  95   
 
02/05 1733 98.2 96 16 168/88  98 Room Air Room Air 
 
02/05 1548  98 16 170/100  98 Room Air Room Air 
 
02/05 1358 98.1 100 20 172/102  98 Room Air  
 
02/05 1356      96   
 
02/05 1215 98.0 98 20 210/130  89   
 
 
 Intake & Output
 
 
 02/06 1600 02/06 0800 02/06 0000 02/05 1600 02/05 0800 02/05 0000
 
Intake Total  350 400 0  
 
Output Total  1    
 
Balance  349 400 0  
 
       
 
Intake, IV  350 400   
 
Intake, Oral  0 0 0  
 
Output, Stool  1    
 
Patient 165 lb  185 lb   
 
Weight      
 
Weight Bed scale     
 
Measurement      
 
Method      
 
 
 
Physical Exam:
She is in no distress
HEENT exam is normal
Chest is clear
Heart regular rhythm, no murmurs
Extremities no edema
Current Medications:
 Current Medications
 
 
  Sig/Misyt Start time  Last
 
Medication Dose Route Stop Time Status Admin
 
Acetaminophen 650 MG Q6P PRN 02/05 1800 AC 
 
  PO   
 
Acetaminophen 650 MG Q8H PRN 02/05 1800 CAN 
 
  PO   
 
Amlodipine Besylate 5 MG DAILY 02/05 1759 AC 02/06
 
  PO   1008
 
Carvedilol 25 MG BID 02/05 2200 AC 02/06
 
  PO   1009
 
Dextrose/Sodium  1,000 ML Q20H 02/05 1800 AC 02/05
 
Chloride  IV 02/07 0959  1839
 
Epoetin Jw 4,000 UNIT PER PROTOCL PRN 02/06 1030 AC 
 
  IV   
 
Insulin Human Regular 0 Q6 02/05 2359 AC 
 
  SC   
 
Levothyroxine Sodium 0.025 MG DAILY 02/05 1800 AC 02/06
 
  PO   1008
 
Losartan Potassium 50 MG DAILY 02/06 1000 AC 02/06
 
  PO   1007
 
Melatonin 10 MG AT BEDTIME 02/05 2200 AC 02/05
 
  PO   2244
 
Midodrine 5 MG DAILY PRN 02/05 1800 AC 
 
  PO   
 
Pantoprazole Sodium 40 MG DAILY 02/05 1756 AC 02/06
 
  IV   1007
 
Paricalcitol 1 MCG PER PROTOCL PRN 02/06 1030 AC 
 
  IV   
 
Pravastatin Sodium 20 MG 1700 02/06 1700 AC 
 
  PO   
 
Sertraline HCl 50 MG DAILY 02/05 1759 AC 02/06
 
  PO   1008
 
Sevelamer Carbonate 2,400 MG WM 02/05 1800 AC 02/05
 
  PO   1857
 
 
 
 
Results
Last 48 Hrs of Labs/Mics:
 Laboratory Tests
 
02/06/18 1005:
WBC Pending, RBC Pending, Hgb Pending, Hct Pending, MCV Pending, MCH Pending, 
MCHC Pending, RDW Pending, Plt Count Pending, MPV Pending, Gran % Pending, 
Lymphocytes % Pending, Monocytes % Pending, Eosinophils % Pending, Basophils % 
Pending, Absolute Granulocytes Pending, Absolute Lymphocytes Pending, Absolute 
Monocytes Pending, Absolute Eosinophils Pending, Absolute Basophils Pending
 
02/06/18 0515:
Troponin I 0.27 *H
 
02/06/18 0515:
Anion Gap 16, Estimated GFR 5  L, BUN/Creatinine Ratio 2.3  L
 
02/05/18 2315:
Troponin I 0.30 *H
 
02/05/18 1836:
Troponin I 0.25 *H
 
02/05/18 1220:
Anion Gap 18  H, Estimated GFR 6  L, BUN/Creatinine Ratio 2.3  L, Glucose 127  H
, Calcium 9.1, Total Bilirubin 1.2, AST 39  H, ALT 32, Alkaline Phosphatase 81, 
Troponin I 0.25 *H, Total Protein 7.3, Albumin 4.0, Globulin 3.3, Albumin/
Globulin Ratio 1.2, PT 13.9  H, INR 1.33  H, APTT 37, CBC w Diff MAN DIFF 
ORDERED, RBC 4.27, MCV 88.7, MCH 28.5, MCHC 32.2  L, RDW 17.1  H, Segmented 
Neutrophils 53, Band Neutrophils 1, Lymphocytes 36, Monocytes 10  H, 
Anisocytosis 1+
 
Recent Imaging Studies:
The patient is hemodynamically stable.  She did have a small troponin rise with 
a peak of 0.3.  Her EKG shows some additional T-wave changes which are 
nonspecific at this time.
 
I recommend no change in his medication at this time.  There is no evidence of 
active GI bleeding then we can we should probably start her on an antiplatelet 
agent.  She may need endoscopy or colonoscopy for full GI workup first.  I will 
review her echocardiogram when it is done and have further recommendations at 
that time.  There are no plans for cardiac intervention at this time.
 
 
 
Assessment/Plan
Assessment/Plan
I recommend no change in his medication at this time.  If there is no evidence 
of active GI bleeding then we can we should probably start her on an 
antiplatelet agent.  She may need endoscopy or colonoscopy for full GI workup 
first.  I will review her echocardiogram when it is done and have further 
recommendations at that time.  There are no plans for cardiac intervention at 
this time.
 
 
Continue telemetry? Yes

## 2018-02-06 NOTE — CONS- NEPHROLOGY
General Information and HPI
 
Consulting Request
Date of Consult: 02/06/18
Requested By:
Walter Moffett MD
 
History of Present Illness:
Ms. Cooks is a 71 yo F with ESRD due to DM on dialysis since 2012. She was in 
her usual state of health when she developed black bowel movements 1 day PTA. 
She went to her PCP yesterday where she was found to be dizzy and sent in with 
the dagnosis of Natalia.   
 
Allergies/Medications
Allergies:
Coded Allergies:
No Known Drug Allergies (Intermediate, NONE 11/02/17)
 
Home Med List:
Acetaminophen (Mapap) 325 MG TABLET   2 TAB PO Q8H PRN PAIN  (Reported)
Amlodipine Besylate 5 MG TABLET   1 TAB PO DAILY HEART  (Reported)
Carvedilol 25 MG TABLET   1 TAB PO BID HEART/BP  (Reported)
Glimepiride 1 MG TABLET   1 TAB PO DAILY AC DM  (Reported)
Levothyroxine Sodium 25 MCG TABLET   1 TAB PO DAILY THYROID  (Reported)
Losartan Potassium (Cozaar) 50 MG TABLET   1 TAB PO DAILY HEART/BP  (Reported)
Melatonin 10 MG TABLET   1 TAB PO QHS SLEEP  (Reported)
Midodrine HCl 5 MG TABLET   1 TAB PO PRN BP  (Reported)
Pravastatin Sodium 40 MG TABLET   1 TAB PO DAILY CHOLESTEROL  (Reported)
Sertraline HCl 50 MG TABLET   1 TAB PO DAILY MENTAL HEALTH  (Reported)
Sevelamer Carbonate (Renvela) 800 MG TABLET   3 TAB PO TID WM RENAL  (Reported)
 
 
Review of Systems
Review of Systems:
As in HPI
 
Past History
 
Travel History
Traveled to [place] past 21 day No
 
Medical History
Neurological: NONE
EENT: NONE
Cardiovascular: hypertension
Respiratory: NONE
Gastrointestinal: NONE
Hepatic: NONE
Renal: ESRD on HD
Musculoskeletal: NONE
Psychiatric: NONE
Endocrine: diabetes
 
Surgical History
Surgical History: non-contributory
 
Psychosocial History
Smoking Status: Never Smoked
 
Exam & Diagnostic Data
Vital Signs and I&O
Pleasant F NAD
120/64  60  98.2
Skin neg rash
Eyes anicteric
ENT moist
Lungs clear to A
Cor RRR
Abd soft  N/T
Ext neg edema
 
 
Results
Pertinent Lab Results:
 
141 / 103 / 18 /
3.6  / 22 / 7.8
 
Hg 12.2
 
 
Assessment/Plan
 
Assessment/Recommendations
Assessment:
71 yo admitted with black stools c/w GI bleed.  Await GI consult/evaluation.  I 
will arrange dialysis today without heparin given Natalia. I will place her on 
4000 units epogen with the expectation that Hg may fall a bit.   Thanks will 
follow.
 
Recommendations:
.

## 2018-02-06 NOTE — CONS- GASTROENTEROLOGY
General Information and HPI
 
Consulting Request
Date of Consult: 02/06/18
Requested By:
Walter Moffett MD
 
Reason for Consult:
Melena
Source of Information: patient, [facility] Record
Exam Limitations: poor historian
History of Present Illness:
Patient is a 72-year-old female with a history of end-stage renal disease on 
hemodialysis.  Patient was brought to the [facility] emergency department 
with a chief complaint of feeling weak.  She reported that she had had 2 melenic
stools in the 1 or 2 days prior to admission.  In the week prior she had had a 
fall but did not hit her head.  She is not on anticoagulants at home but does 
report that she takes NSAID asked.  She has had no prior melena.  She denies 
nausea or vomiting or abdominal pain.  She has had no hematemesis.  She denies 
bright red blood per rectum.  She's had no change in stool caliber or bowel 
habit denies diarrhea or constipation.
 
She was seen by cardiologist for mild increase in troponin but there is no plans
for cardiac intervention at this time.  Cardiology had mentioned that prior to 
beginning antiplatelet agents there may be a need for both EGD and colonoscopy. 
Patient had no palpitations or shortness of breath.
 
Allergies/Medications
Allergies:
Coded Allergies:
No Known Drug Allergies (Intermediate, NONE 11/02/17)
 
Home Med List:
Acetaminophen (Mapap) 325 MG TABLET   2 TAB PO Q8H PRN PAIN  (Reported)
Amlodipine Besylate 5 MG TABLET   1 TAB PO DAILY HEART  (Reported)
Carvedilol 25 MG TABLET   1 TAB PO BID HEART/BP  (Reported)
Glimepiride 1 MG TABLET   1 TAB PO DAILY AC DM  (Reported)
Levothyroxine Sodium 25 MCG TABLET   1 TAB PO DAILY THYROID  (Reported)
Losartan Potassium (Cozaar) 50 MG TABLET   1 TAB PO DAILY HEART/BP  (Reported)
Melatonin 10 MG TABLET   1 TAB PO QHS SLEEP  (Reported)
Midodrine HCl 5 MG TABLET   1 TAB PO PRN BP  (Reported)
Pravastatin Sodium 40 MG TABLET   1 TAB PO DAILY CHOLESTEROL  (Reported)
Sertraline HCl 50 MG TABLET   1 TAB PO DAILY MENTAL HEALTH  (Reported)
Sevelamer Carbonate (Renvela) 800 MG TABLET   3 TAB PO TID WM RENAL  (Reported)
 
Current Medications:
 Current Medications
 
 
  Sig/Misty Start time  Last
 
Medication Dose Route Stop Time Status Admin
 
Acetaminophen 650 MG Q6P PRN 02/05 1800 AC 
 
  PO   
 
Acetaminophen 650 MG Q8H PRN 02/05 1800 CAN 
 
  PO   
 
Amlodipine Besylate 5 MG DAILY 02/05 1759 AC 02/06
 
  PO   1008
 
Carvedilol 25 MG BID 02/05 2200 AC 02/06
 
  PO   1009
 
Dextrose/Sodium  1,000 ML Q20H 02/05 1800 DC 02/05
 
Chloride  IV 02/07 0959  1839
 
Epoetin Jw 4,000 UNIT PER PROTOCL PRN 02/06 1030 AC 
 
  IV   
 
Insulin Aspart 0 TIDAC 02/06 1700 AC 
 
  SC 02/07 0000  
 
Insulin Human Regular 0 Q6 02/06 2359 AC 
 
  SC   
 
Insulin Human Regular 0 Q6 02/05 2359 DC 
 
  SC   
 
Levothyroxine Sodium 0.025 MG DAILY 02/05 1800 AC 02/06
 
  PO   1008
 
Losartan Potassium 50 MG DAILY 02/06 1000 AC 02/06
 
  PO   1007
 
Melatonin 10 MG AT BEDTIME 02/05 2200 AC 02/05
 
  PO   2244
 
Midodrine 5 MG DAILY PRN 02/05 1800 AC 
 
  PO   
 
Pantoprazole Sodium 40 MG BID 02/06 2200 AC 
 
  IV   
 
Pantoprazole Sodium 40 MG DAILY 02/05 1756 DC 02/06
 
  IV   1007
 
Paricalcitol 1 MCG PER PROTOCL PRN 02/06 1030 AC 
 
  IV   
 
Pravastatin Sodium 20 MG 1700 02/06 1700 AC 
 
  PO   
 
Sertraline HCl 50 MG DAILY 02/05 1759 AC 02/06
 
  PO   1008
 
Sevelamer Carbonate 2,400 MG WM 02/05 1800 AC 02/05
 
  PO   1857
 
 
 
 
Past History
 
Travel History
Traveled to [place] past 21 day No
 
Medical History
Neurological: NONE
EENT: NONE
Cardiovascular: hypertension
Respiratory: NONE
Gastrointestinal: NONE
Hepatic: NONE
Renal: ESRD on HD
Musculoskeletal: NONE
Psychiatric: NONE
Endocrine: diabetes
 
Surgical History
Surgical History: non-contributory
 
Psychosocial History
Smoking Status: Never Smoked
 
Review of Systems
 
Review of Systems
Constitutional:
Reports: malaise, weakness. 
EENTM:
Denies: no symptoms. 
Cardiovascular:
Denies: no symptoms. 
Respiratory:
Denies: no symptoms. 
GI:
Reports: see HPI, melena.  Denies: abdominal pain, nausea, vomiting. 
Genitourinary:
Denies: no symptoms. 
Musculoskeletal:
Denies: no symptoms. 
Skin:
Denies: no symptoms. 
Neurological/Psychological:
Denies: no symptoms. 
Hematologic/Endocrine:
Denies: no symptoms. 
 
Exam & Diagnostic Data
Vital Signs and I&O
Vital Signs
 
 
Date Time Temp Pulse Resp B/P B/P Pulse O2 O2 Flow FiO2
 
     Mean Ox Delivery Rate 
 
02/06 1009  60  120/64     
 
02/06 1008  60  120/64     
 
02/06 1007  60  120/64     
 
02/06 0713 98.2 60 20 120/64  94 Room Air  
 
02/05 2245  96  160/90     
 
02/05 2244  96  160/90     
 
02/05 2244 98.2 96 24 160/90  95   
 
02/05 1802 97.8 87 28 156/78  95   
 
02/05 1733 98.2 96 16 168/88  98 Room Air Room Air 
 
02/05 1548  98 16 170/100  98 Room Air Room Air 
 
 
 Intake & Output
 
 
 02/06 1600 02/06 0400 02/05 1600 02/05 0400 02/04 1600 02/04 0400
 
Intake Total 380 400 0   
 
Output Total 1     
 
Balance 379 400 0   
 
       
 
Intake,  400    
 
Intake, Oral 30 0 0   
 
Output, Stool 1     
 
Patient 165 lb 185 lb    
 
Weight      
 
Weight Bed scale     
 
Measurement      
 
Method      
 
 
 
 
Physical Exam
General Appearance: well developed/nourished, no apparent distress, alert, awake
, comfortable, On Hemodialysis
Head: atraumatic, normal appearance
Eyes:
Bilateral: normal appearance. 
Ears, Nose, Throat: hearing grossly normal
Neck: supple, full range of motion
Respiratory: normal breath sounds, lungs clear
Cardiovascular: regular rate/rhythm, Normal S1 and S2 without rub, murmur or 
gallop
Gastrointestinal: normal bowel sounds, soft, non-tender, no organomegaly
Back: normal inspection
Neurologic/Psych: awake, alert, oriented x 3
Cranial Nerves: Cranial nerves II-XII grossly intact
Skin: intact, normal color, warm/dry
 
Results
Pertinent Lab Results:
 Laboratory Tests
 
 
 02/06 02/06
 
 1240 1005
 
Chemistry  
 
  Sodium (137 - 145 mmol/L) 140 
 
  Potassium (3.5 - 5.1 mmol/L) 3.8 
 
  Chloride (98 - 107 mmol/L) 102 
 
  Carbon Dioxide (22 - 30 mmol/L) 22 
 
  Anion Gap (5 - 16) 15 
 
  BUN (7 - 17 mg/dL) 22  H 
 
  Creatinine (0.5 - 1.0 mg/dL) 8.0 *H 
 
  Estimated GFR (>60 ml/min) 5  L 
 
  BUN/Creatinine Ratio (7 - 25 %) 2.8  L 
 
  Calcium (8.4 - 10.2 mg/dL) 8.3  L 
 
Hematology  
 
  CBC w Diff MAN DIFF ORDERED MAN DIFF ORDERED
 
  WBC (4.8 - 10.8 /CUMM) 2.7  L 3.6  L
 
  RBC (4.20 - 5.40 /CUMM) 3.99  L 3.98  L
 
  Hgb (12.0 - 16.0 G/DL) 11.4  L 11.4  L
 
  Hct (37 - 47 %) 35.6  L 35.0  L
 
  MCV (81.0 - 99.0 FL) 89.2 88.1
 
  MCH (27.0 - 31.0 PG) 28.6 28.8
 
  MCHC (33.0 - 37.0 G/DL) 32.1  L 32.7  L
 
  RDW (11.5 - 14.5 %) 18.0  H 17.1  H
 
  Plt Count (130 - 400 /CUMM)      
 
  MPV (7.4 - 10.4 FL) 10.9  H 
 
  Gran % (42.2 - 75.2 %) 44.5 
 
  Lymphocytes % (20.5 - 51.1 %) 35.0 
 
  Monocytes % (1.7 - 9.3 %) 20.2  H 
 
  Eosinophils % (0 - 5 %) 0.3 
 
  Basophils % (0.0 - 2.0 %) 0 
 
  Absolute Granulocytes (1.4 - 6.5 /CUMM) 1.2  L 
 
  Segmented Neutrophils (42.2 - 75.2 %)  49
 
  Band Neutrophils (0.0 - 5.0 %)  1
 
  Absolute Lymphocytes (1.2 - 3.4 /CUMM) 1.0  L 
 
  Lymphocytes (20.5 - 51.1 %)  37
 
  Monocytes (1.7 - 9.3 %)  12  H
 
  Absolute Monocytes (0.10 - 0.60 /CUMM) 0.5 
 
  Eosinophils (0 - 5.0 %)  1
 
  Absolute Eosinophils (0.0 - 0.7 /CUMM) 0 
 
  Absolute Basophils (0.0 - 0.2 /CUMM) 0 
 
  Nucleated RBCs (0.0 - 0.0 /100WBC)  1  H
 
  Normochromic RBCs  VERIFIED
 
  Polychromasia    
 
  Hypochromic-Microcytic 2+ 
 
  Poikilocytosis  RARE
 
  Anisocytosis 2+ 1+
 
 
 
 
 02/06 02/06 02/05 02/05
 
 0515 5587 [id number]
 
Chemistry    
 
  Sodium (137 - 145 mmol/L)  141  
 
  Potassium (3.5 - 5.1 mmol/L)  3.6  
 
  Chloride (98 - 107 mmol/L)  103  
 
  Carbon Dioxide (22 - 30 mmol/L)  22  
 
  Anion Gap (5 - 16)  16  
 
  BUN (7 - 17 mg/dL)  18  H  
 
  Creatinine (0.5 - 1.0 mg/dL)  7.8 *H  
 
  Estimated GFR (>60 ml/min)  5  L  
 
  BUN/Creatinine Ratio (7 - 25 %)  2.3  L  
 
  Troponin I (< 0.11 ng/ml) 0.27 *H  0.30 *H 0.25 *H
 
 
 
 
 02/05
 
 1220
 
Chemistry 
 
  Sodium (137 - 145 mmol/L) 142
 
  Potassium (3.5 - 5.1 mmol/L) 3.8
 
  Chloride (98 - 107 mmol/L) 103
 
  Carbon Dioxide (22 - 30 mmol/L) 21  L
 
  Anion Gap (5 - 16) 18  H
 
  BUN (7 - 17 mg/dL) 15
 
  Creatinine (0.5 - 1.0 mg/dL) 6.5 *H
 
  Estimated GFR (>60 ml/min) 6  L
 
  BUN/Creatinine Ratio (7 - 25 %) 2.3  L
 
  Glucose (65 - 99 mg/dL) 127  H
 
  Calcium (8.4 - 10.2 mg/dL) 9.1
 
  Total Bilirubin (0.2 - 1.3 mg/dL) 1.2
 
  AST (14 - 36 U/L) 39  H
 
  ALT (9 - 52 U/L) 32
 
  Alkaline Phosphatase (<127 U/L) 81
 
  Troponin I (< 0.11 ng/ml) 0.25 *H
 
  Total Protein (6.3 - 8.2 g/dL) 7.3
 
  Albumin (3.5 - 5.0 g/dL) 4.0
 
  Globulin (1.9 - 4.2 gm/dL) 3.3
 
  Albumin/Globulin Ratio (1.1 - 2.2 %) 1.2
 
Coagulation 
 
  PT (9.4 - 12.5 SEC) 13.9  H
 
  INR (0.90 - 1.19) 1.33  H
 
  APTT (25 - 37 SEC) 37
 
Hematology 
 
  CBC w Diff MAN DIFF ORDERED
 
  WBC (4.8 - 10.8 /CUMM) 3.8  L
 
  RBC (4.20 - 5.40 /CUMM) 4.27
 
  Hgb (12.0 - 16.0 G/DL) 12.2
 
  Hct (37 - 47 %) 37.8
 
  MCV (81.0 - 99.0 FL) 88.7
 
  MCH (27.0 - 31.0 PG) 28.5
 
  MCHC (33.0 - 37.0 G/DL) 32.2  L
 
  RDW (11.5 - 14.5 %) 17.1  H
 
  Plt Count (/CUMM)   
 
  Segmented Neutrophils (42.2 - 75.2 %) 53
 
  Band Neutrophils (0.0 - 5.0 %) 1
 
  Lymphocytes (20.5 - 51.1 %) 36
 
  Monocytes (1.7 - 9.3 %) 10  H
 
  Anisocytosis 1+
 
 
 
 
Assessment/Plan
Assessment/Recommendations:
ASSESSMENT:
1.  Melena -- w/o any change in H/H.  ? whether patient took peptobismol or 
other similar agent.  Denies taking iron supplements.  However, may have had PUD
given use of NSAIDs.
2.  ESRD on HD
3.  Elevated Troponin -- Medical Management at this time
 
RECOMMENDATIONS:
1.  NPO after midnight
2.  EGD tomorrow morning
3.  Protonix 40 mg IV BID
4.  Follow H/H
5.  Avoid NSAIDs and ASA
 
 
Consult Acknowledgment
- Thank you for your consult request.

## 2018-02-07 VITALS — DIASTOLIC BLOOD PRESSURE: 60 MMHG | SYSTOLIC BLOOD PRESSURE: 126 MMHG

## 2018-02-07 VITALS — DIASTOLIC BLOOD PRESSURE: 76 MMHG | SYSTOLIC BLOOD PRESSURE: 132 MMHG

## 2018-02-07 VITALS — SYSTOLIC BLOOD PRESSURE: 128 MMHG | DIASTOLIC BLOOD PRESSURE: 72 MMHG

## 2018-02-07 LAB
ABSOLUTE GRANULOCYTE CT: 2.4 /CUMM (ref 1.4–6.5)
BASOPHILS # BLD: 0 /CUMM (ref 0–0.2)
BASOPHILS NFR BLD: 0 % (ref 0–2)
EOSINOPHIL # BLD: 0 /CUMM (ref 0–0.7)
EOSINOPHIL NFR BLD: 0 % (ref 0–5)
ERYTHROCYTE [DISTWIDTH] IN BLOOD BY AUTOMATED COUNT: 17.4 % (ref 11.5–14.5)
GRANULOCYTES NFR BLD: 63.4 % (ref 42.2–75.2)
HCT VFR BLD CALC: 35.9 % (ref 37–47)
LYMPHOCYTES # BLD: 0.7 /CUMM (ref 1.2–3.4)
MCH RBC QN AUTO: 28.8 PG (ref 27–31)
MCHC RBC AUTO-ENTMCNC: 32.5 G/DL (ref 33–37)
MCV RBC AUTO: 88.6 FL (ref 81–99)
MONOCYTES # BLD: 0.7 /CUMM (ref 0.1–0.6)
PMV BLD AUTO: 12.9 FL (ref 7.4–10.4)
RED BLOOD CELL CT: 4.05 /CUMM (ref 4.2–5.4)
WBC # BLD AUTO: 3.8 /CUMM (ref 4.8–10.8)

## 2018-02-07 PROCEDURE — 0DB78ZX EXCISION OF STOMACH, PYLORUS, VIA NATURAL OR ARTIFICIAL OPENING ENDOSCOPIC, DIAGNOSTIC: ICD-10-PCS

## 2018-02-07 PROCEDURE — 0FJB8ZZ INSPECTION OF HEPATOBILIARY DUCT, VIA NATURAL OR ARTIFICIAL OPENING ENDOSCOPIC: ICD-10-PCS

## 2018-02-07 NOTE — PROC NOTE ENDOSCOPY
**See Addendum**
Endoscopy Procedure
Medical History: unchanged
Mental Status: alert/oriented
Heart/Lung Eval Prior to Sedation: within normal limits
Candidate for Sedation? Yes
Procedure Date: 02/07/18
Procedure Type: EGD w/biopsy
:
MD Yap Deborah
 
ASA Classification: III
Indications:
1.  Melena
2.  Upper GI Bleeding
Instrument: diagnostic gastroscope
Meds Received: MAC
Patient's Tolerance: good
Complications: none
Extent Reached: second part of duodenum
Procedure:
Note: Informed consent was obtained prior to procedure.  Risks and benefits of 
procedure were discussed with patient.  Potential complications discussed 
included perforation, bleeding, abdominal pain, and adverse reaction to 
medications.  It was explained that iany or all of these complications could 
result in the need for extended hospitalization, emergency surgery, transfusion 
of packed red blood cells (with the risk of HIV or hepatitis virus), intubation 
with mechanical ventilation, and possible need for antibiotics.  It was further 
explained that an existing tumor polyp or mucosal abnormality might not be 
identified at the time of the procedure thus resulting in a missed opportunity 
for early diagnosis and treatment of a gastrointestinal malignancy or disease 
with possible interval development of a gastrointestinal cancer or other disease
with possible worsening of clinical condition in the interval between 
endoscopies.  It was also discussed that complications are not limited to those 
listed above.  Possible alternatives to endoscopic treatment or evaluation were 
discussed.  All questions were answered.
 
Continuous EKG and blood pressure monitors were attached.  Supplemental oxygen 
was provided with O2 Sat monitoring.  
 
Patient was placed in the left lateral decubitus position.  A surgical timeout 
was performed.  All persons in the room were identified.  All concerns were 
expressed and answered.
 
A bite block was placed in the mouth and sedation was administered by anesthesia
and titrated to comfort prior to starting the procedure.  The Olympus upper 
endoscope was advanced under direct vision to the level of the third portion of 
the duodenum.
 
Esophagus: The esophagus had a normal mucosal vascular pattern throughout its 
entirety.  The GE junction was identified and was normal.  The Z line was 
located at 37 cm from the incisors and was non-displaced.
 
Stomach: The stomach had a prominent erythematous prepyloric fold with no 
associated ulceration.  Multiple biopsies were obtained from it.  Retroflexed 
view of the cardiofundic region revealed patchy reticulation with hemorrhagic 
spots and no active bleeding.  Within the gastric body there was similar 
reticulation and when viewed with NBI had an abnormal vascular pattern.  There 
were normal rugae and normal distensibility.  The pylorus was patent and easily 
intubated.  Biopsies were obtained from the antrum, angularis, gastric body and 
lesser curvature to rule out H. Pylori as well as intestinal metaplasia.
 
Duodenum: The duodenum was fully examined from bulb down to the third portion.  
There was a normal mucosal vascular pattern throughout.
 
With the endoscope in the forward-viewing position, it was slowly withdrawn and 
all areas were re-inspected and findings are as described previously.  Patient 
tolerated the procedure well.
 
EBL:  Minimal
 
Specimens Removed:
1.  Prominent pyloric gastric fold
2.  antrum, angularis, gastric body and lesser curvature to rule out H. Pylori 
as well as intestinal metaplasia.
 
Findings:
1.  Prominent prepyloric fold
2.  Gastritis with hemorrhagic spots and abnormal vascularity
 
Impression:
1.  Prominent prepyloric fold
2.  Gastritis with hemorrhagic spots and abnormal vascularity
 
Recommendations:
1.  Await pathology
2.  Advance diet
3.  DC IV PPI
4.  Start Protonix 40 mg by mouth every morning 30-45 minutes before breakfast
5.  Patient will need follow-up with Dr. Yap as an outpatient for pathology
review

## 2018-02-07 NOTE — PN- HOUSESTAFF
**See Addendum**
Subjective
Follow-up For:
GI bleeding
ESRD on dialysis
Increased troponin
Subjective:
Patient visited today, was lying in bed comfortably in no acute distress, was 
alert and oriented.
 
No fever or chills, no chest pain, no other events.
 
Planned to undergo Upper GI endoscopy today. HD done yesterday and would be 
planned for tomorrow. 
 
Review of Systems
Constitutional:
Reports: see HPI. 
 
Objective
Last 24 Hrs of Vital Signs/I&O
 Vital Signs
 
 
Date Time Temp Pulse Resp B/P B/P Pulse O2 O2 Flow FiO2
 
     Mean Ox Delivery Rate 
 
 0753  68  132/76     
 
 0753  68  132/76     
 
 0752  68  132/76     
 
 0639 98.9 69 18 132/76  93 Room Air  
 
 2209 98.9 60 20 118/70  94   
 
 2100  56  124/64     
 
 1623 98.7 56 20 124/64  96 Room Air  
 
 1009  60  120/64     
 
 1008  60  120/64     
 
 1007  60  120/64     
 
 
 Intake & Output
 
 
  1600  0800  0000
 
Intake Total  10 200
 
Output Total   
 
Balance  10 200
 
    
 
Intake, IV  10 
 
Intake, Oral  0 200
 
Patient   177 lb
 
Weight   
 
Weight   Bed scale
 
Measurement   
 
Method   
 
 
 
 
Physical Exam
General Appearance: Alert, Oriented X3, Cooperative, No Acute Distress
Skin: No Significant Lesion
Skin Temp/Moisture Exam: Warm/Dry
Sepsis Skin Exam (color): Normal for Ethnicity
HEENT: Atraumatic, EOMI, Mucous Membr. moist/pink
Cardiovascular: Normal S1, Normal S2
Lungs: Clear to Auscultation
Abdomen: Normal Bowel Sounds, Soft
Neurological: Normal Speech
Extremities: No Edema
Current Medications:
 Current Medications
 
 
  Sig/Misty Start time  Last
 
Medication Dose Route Stop Time Status Admin
 
Acetaminophen 650 MG Q6P PRN  1800 AC 
 
  PO   
 
Amlodipine Besylate 5 MG DAILY  1759 AC 
 
  PO   0752
 
Carvedilol 25 MG BID  2200 AC 
 
  PO   0753
 
Dextrose/Sodium  1,000 ML Q20H / 1800 DC 
 
Chloride  IV  0959  1839
 
Epoetin Jw 4,000 UNIT PER PROTOCL PRN  1030 AC 
 
  IV   
 
Insulin Aspart 0 TIDAC  1700 DC 
 
  SC  0000  
 
Insulin Human Regular 0 Q6  2359 AC 
 
  SC   
 
Insulin Human Regular 0 Q6  2359 DC 
 
  SC   
 
Levothyroxine Sodium 0.025 MG DAILY  1800 AC 
 
  PO   0752
 
Losartan Potassium 50 MG DAILY  1000 AC 
 
  PO   0753
 
Melatonin 10 MG AT BEDTIME 2200 AC 
 
  PO   2059
 
Midodrine 5 MG DAILY PRN  1800 AC 
 
  PO   
 
Pantoprazole Sodium 40 MG BID  2200 AC 
 
  IV   0752
 
Pantoprazole Sodium 40 MG DAILY  1756 DC 
 
  IV   1007
 
Paricalcitol 1 MCG PER PROTOCL PRN  1030 AC 
 
  IV   
 
Pravastatin Sodium 20 MG 1700  1700 AC 
 
  PO   1823
 
Sertraline HCl 50 MG DAILY  175 AC 
 
  PO   0753
 
Sevelamer Carbonate 2,400 MG WM  1800 AC 
 
  PO   1821
 
 
 
 
Last 24 Hrs of Lab/Daniel Results
Last 24 Hrs of Labs/Mics:
 Laboratory Tests
 
18 0655:
Sodium Pending, Potassium Pending, Chloride Pending, Carbon Dioxide Pending, 
Anion Gap Pending, BUN Pending, Creatinine Pending, BUN/Creatinine Ratio Pending
, CBC w Diff Pending, WBC Pending, RBC Pending, Hgb Pending, Hct Pending, MCV 
Pending, MCH Pending, MCHC Pending, RDW Pending, Plt Count Pending, MPV Pending
 
18 1240:
Anion Gap 15, Estimated GFR 5  L, BUN/Creatinine Ratio 2.8  L, Calcium 8.3  L, 
CBC w Diff MAN DIFF ORDERED, RBC 3.99  L, MCV 89.2, MCH 28.6, MCHC 32.1  L, RDW 
18.0  H, MPV 10.9  H, Gran % 44.5, Lymphocytes % 35.0, Monocytes % 20.2  H, 
Eosinophils % 0.3, Basophils % 0, Absolute Granulocytes 1.2  L, Absolute 
Lymphocytes 1.0  L, Absolute Monocytes 0.5, Absolute Eosinophils 0, Absolute 
Basophils 0, Hypochromic-Microcytic 2+, Anisocytosis 2+
 
18 1005:
CBC w Diff MAN DIFF ORDERED, RBC 3.98  L, MCV 88.1, MCH 28.8, MCHC 32.7  L, RDW 
17.1  H, Segmented Neutrophils 49, Band Neutrophils 1, Lymphocytes 37, Monocytes
12  H, Eosinophils 1, Nucleated RBCs 1  H, Normochromic RBCs VERIFIED, 
Polychromasia   , Poikilocytosis RARE, Anisocytosis 1+
 
 
Assessment/Plan
Assessment:
72 Y F, presented with black BM x2 and lightheadedness
 
PMH of ESRD on HD (Tues/Thurs/Sat), HTN, DM, hypothyroidism
 
VS, Ph Ex at admission:
 over 130, OH 98, RR 20, no fever, later /102
Labs at admission:
C BC: Hgb 12.2, WBC 3.8, PLT was not contacted, INR 1.3, BEP, CR 6.5, AST 39, 
troponin 0.25
Imagings at admission:
CXR:
Cardiomegaly and pulmonary venous hypertension.
Low lung volumes with bibasilar atelectasis but no dense focal consolidation.
 
Patient was admitted to telemetry floor for management of following conditions:
 
GIB
Likely upper GI bleeding considering black bowel movement which indicates 
presence of blood for several hours.  However patient had no hematemesis that is
relatively indicative of upper GI bleeding.  At this point the source could be 
upper or lower GI bleeding as clinical presentation doesn't point to the source,
yet upper is more likely as noted above.
We will admit patient to telemetry floor 
 
IV Protonix twice a day was adminsitered initially and was changed to PO 
medication after Endoscopy:
 
Endoscopy:
 1.  Prominent prepyloric fold
 2.  Gastritis with hemorrhagic spots and abnormal vascularity
 [place] was done from:
 1.  Prominent prepyloric fold
 2.  Gastritis with hemorrhagic spots and abnormal vascularity
 
-Continue patient to telemetry floor
-Vital sign, I/O
-2 large IV line all the time
- resumed diet
- PO protonix
- We will hold off anti PLT according to GI
- follow-up with Dr. Yap as an outpatient for pathology review
 
ESRD on HD
Patient has a history of end-stage renal disease on hemodialysis on Tuesday, 
Thursday, Saturday.  We will consult nephrology and continue the same schedule.
-Nephrology consult
-Hemodialysis Tues/Thurs/Sat
 
Increased troponins
This could be secondary to an decisional disease, however we will continue to 
trend down to rule out ACS.  Patient doesn't have any symptoms at this point.
-Serial EKG and troponin ruled out ACS
-Follow Cardiology consult
-Follow up Echo
 
Full code
Dialysis diet
DVT PPX: ALPS
Problem List:
 1. ESRD (end stage renal disease)
 
 2. GIB (gastrointestinal bleeding)
 
 3. Elevated troponin
 
Pain Ratin
Pain Location:
None
Pain Goal: Pain 4 or less
Pain Plan:
Continue current plan
Tomorrow's Labs & Rationales:
CBC BEP

## 2018-02-07 NOTE — PN- CARDIOLOGY
Subjective
Subjective:
The patient has no complaints today.  She had her endoscopy done which showed 
gastritis and she has been started on appropriate medication.  She did not have 
significant drop in her hemoglobin and hematocrit.  She has no cardiac 
complaints and no arrhythmias.
 
Her echocardiogram showed mildly reduced LV function.
 
Objective
Vital Signs and I&Os
Vital Signs
 
 
Date Time Temp Pulse Resp B/P B/P Pulse O2 O2 Flow FiO2
 
     Mean Ox Delivery Rate 
 
02/07 0753  68  132/76     
 
02/07 0753  68  132/76     
 
02/07 0752  68  132/76     
 
02/07 0639 98.9 69 18 132/76  93 Room Air  
 
02/06 2209 98.9 60 20 118/70  94   
 
02/06 2100  56  124/64     
 
02/06 1623 98.7 56 20 124/64  96 Room Air  
 
 
 Intake & Output
 
 
 02/07 1600 02/07 0800 02/07 0000 02/06 1600 02/06 0800 02/06 0000
 
Intake Total 260 10 200 30 350 400
 
Output Total     1 
 
Balance 260 10 200 30 349 400
 
       
 
Intake, IV 10 10   350 400
 
Intake, Oral 250 0 200 30 0 0
 
Output, Stool     1 
 
Patient 169 lb  177 lb 165 lb  185 lb
 
Weight      
 
Weight Bed scale  Bed scale Bed scale  
 
Measurement      
 
Method      
 
 
 
Physical Exam:
She is in no distress 
HEENT exam normal
Chest clear
Heart regular rhythm, no murmurs
Current Medications:
 Current Medications
 
 
  Sig/Misty Start time  Last
 
Medication Dose Route Stop Time Status Admin
 
Acetaminophen 650 MG Q6P PRN 02/05 1800 AC 
 
  PO   
 
Amlodipine Besylate 5 MG DAILY 02/05 1759 AC 02/07
 
  PO   0752
 
Carvedilol 25 MG BID 02/05 2200 AC 02/07
 
  PO   0753
 
Chlorhexidine  1 GM .STK-MED ONE 02/07 0919 DC 
 
Gluconate  TOP 02/07 0920  
 
Dextrose/Sodium  1,000 ML Q20H 02/05 1800 DC 02/05
 
Chloride  IV 02/07 0959  1839
 
Epoetin Jw 4,000 UNIT PER PROTOCL PRN 02/06 1030 AC 
 
  IV   
 
Insulin Aspart 0 TIDAC 02/06 1700 DC 
 
  SC 02/07 0000  
 
Insulin Human Regular 0 Q6 02/06 2359 AC 
 
  SC   
 
Insulin Human Regular 0 Q6 02/05 2359 DC 
 
  SC   
 
Levothyroxine Sodium 0.025 MG DAILY 02/05 1800 AC 02/07
 
  PO   0752
 
Losartan Potassium 50 MG DAILY 02/06 1000 AC 02/07
 
  PO   0753
 
Melatonin 10 MG AT BEDTIME 02/05 2200 AC 02/06
 
  PO   2059
 
Midodrine 5 MG DAILY PRN 02/05 1800 AC 
 
  PO   
 
Omeprazole 40 MG DAILY AC 02/08 0700 AC 
 
  PO   
 
Pantoprazole Sodium 40 MG BID 02/06 2200 DC 02/07
 
  IV   0752
 
Pantoprazole Sodium 40 MG DAILY 02/05 1756 DC 02/06
 
  IV   1007
 
Paricalcitol 1 MCG PER PROTOCL PRN 02/06 1030 AC 
 
  IV   
 
Pravastatin Sodium 20 MG 1700 02/06 1700 AC 02/06
 
  PO   1823
 
Sertraline HCl 50 MG DAILY 02/05 1759 AC 02/07
 
  PO   0753
 
Sevelamer Carbonate 2,400 MG WM 02/05 1800 AC 02/07
 
  PO   1323
 
 
 
 
Results
Last 48 Hrs of Labs/Mics:
 Laboratory Tests
 
02/07/18 0655:
Anion Gap 13, Estimated GFR 8  L, BUN/Creatinine Ratio 2.5  L, CBC w Diff MAN 
DIFF ORDERED, RBC 4.05  L, MCV 88.6, MCH 28.8, MCHC 32.5  L, RDW 17.4  H, MPV 
12.9  H, Gran % 63.4, Lymphocytes % 17.4  L, Monocytes % 19.2  H, Eosinophils % 
0, Basophils % 0, Absolute Granulocytes 2.4, Segmented Neutrophils 60, Absolute 
Lymphocytes 0.7  L, Lymphocytes 20  L, Monocytes 20  H, Absolute Monocytes 0.7  
H, Absolute Eosinophils 0, Absolute Basophils 0, Platelet Estimate ADEQUATE, 
Hypochromic-Microcytic 2+, Anisocytosis 2+, Fld Total RBCs Counted 100
 
02/06/18 1240:
Anion Gap 15, Estimated GFR 5  L, BUN/Creatinine Ratio 2.8  L, Calcium 8.3  L, 
CBC w Diff MAN DIFF ORDERED, RBC 3.99  L, MCV 89.2, MCH 28.6, MCHC 32.1  L, RDW 
18.0  H, MPV 10.9  H, Gran % 44.5, Lymphocytes % 35.0, Monocytes % 20.2  H, 
Eosinophils % 0.3, Basophils % 0, Absolute Granulocytes 1.2  L, Absolute 
Lymphocytes 1.0  L, Absolute Monocytes 0.5, Absolute Eosinophils 0, Absolute 
Basophils 0, Hypochromic-Microcytic 2+, Anisocytosis 2+
 
02/06/18 1005:
CBC w Diff MAN DIFF ORDERED, RBC 3.98  L, MCV 88.1, MCH 28.8, MCHC 32.7  L, RDW 
17.1  H, Segmented Neutrophils 49, Band Neutrophils 1, Lymphocytes 37, Monocytes
12  H, Eosinophils 1, Nucleated RBCs 1  H, Normochromic RBCs VERIFIED, 
Polychromasia   , Poikilocytosis RARE, Anisocytosis 1+
 
02/06/18 0515:
Troponin I 0.27 *H
 
02/06/18 0515:
Anion Gap 16, Estimated GFR 5  L, BUN/Creatinine Ratio 2.3  L
 
02/05/18 2315:
Troponin I 0.30 *H
 
02/05/18 1836:
Troponin I 0.25 *H
 
Recent Imaging Studies:
 CONCLUSIONS 
 Left ventricular cavity size at the upper limits of normal. 
 Moderate concentric left ventricular hypertrophy. 
 Mildly reduced global left ventricular systolic function. 
 Left ventricular ejection fraction is estimated at   40-45   %. 
 Abnormal septal motion consistent with left bundle branch block. 
 Mild left atrial dilatation. 
 Mild thickening/calcification of the mitral valve leaflets. 
 Mild mitral annular calcification. 
 Mild mitral regurgitation. 
 Focal thickening of the aortic valve cusps. 
 No aortic stenosis. 
 Moderate-to-severe tricuspid regurgitation. 
 Right ventricular systolic pressure estimated to be within the  
 normal range at  25-30  mmHg. 
 Small posterior pericardial effusion. 
 Dilated IVC. 
 
 Marshal Mascorro M.D. 
 (Electronically Signed) 
 Final Date:      07 February 2018  
                  11:19 
 
 
Assessment/Plan
Assessment/Plan
If okay with GI I would recommend starting her on Plavix for antiplatelet 
effect.  She can be discontinued from telemetry and discharged home per GI when 
stable.  She may need a stress test as an outpatient but I don't think it needs 
to be done in the hospital at this time.
 
Continue telemetry? No

## 2018-02-07 NOTE — ECHOCARDIOGRAM REPORT
COOKS, LUCY 
 
 Age:    72     :    1945      Gender:     F 
 
 MRN:    [id number] 
 
 Exam Date:     2018  
                18:52 
 
 Exam Location: 1  
 [place] 
 
 Ht (in):     66      Wt (lb):      185     BSA:    2.00 
 
 BP:          120     /     64 
 
 Ordering Physician:        Onur Osorio MD 
 
 Referring Physician:       Marshal Mascorro MD  
                            Chief, [facility] 
 
 Technologist:              Eri Alvarado [facility] 
 
 Room Number:               182 
 
 Indications:       MYOCARDIAL ISCHEMIA/MI 
 
 Rhythm:                 Sinus 
 
 Technical Quality:      Good 
 
 FINDINGS 
 
 Left Ventricle 
 Left ventricular cavity size at the upper limits of normal. Moderate  
 concentric left ventricular hypertrophy. Mildly reduced global left  
 ventricular systolic function. Left ventricular ejection fraction is  
 estimated at   40-45   %. Abnormal septal motion consistent with  
 left bundle branch block. Normal left ventricular diastolic filling  
 pattern for age. 
 
 Right Ventricle 
 The right ventricle is normal in size and function. 
 
 Right Atrium 
 The right atrium is normal in size. 
 
 Left Atrium 
 Mild left atrial dilatation. 
 
 Mitral Valve 
 Mild thickening/calcification of the mitral valve leaflets. Mild  
 mitral annular calcification. Mild mitral regurgitation. 
 
 Aortic Valve 
 Focal thickening of the aortic valve cusps. No aortic stenosis. No  
 aortic regurgitation. 
 
 Tricuspid Valve 
 Structurally normal tricuspid valve. Moderate-to-severe tricuspid  
 regurgitation. Right ventricular systolic pressure estimated to be  
 within the normal range at  25-30  mmHg. 
 
 Pulmonic Valve 
 Structurally normal pulmonic valve.  There is no  pulmonic  
 regurgitation. 
 
 Pericardium 
 Small posterior pericardial effusion. 
 
 Great Vessels 
 Normal aortic root dimension.  The aortic arch and great vessels are  
 not well seen.  Dilated IVC. 
 
 CONCLUSIONS 
 Left ventricular cavity size at the upper limits of normal. 
 Moderate concentric left ventricular hypertrophy. 
 Mildly reduced global left ventricular systolic function. 
 Left ventricular ejection fraction is estimated at   40-45   %. 
 Abnormal septal motion consistent with left bundle branch block. 
 Mild left atrial dilatation. 
 Mild thickening/calcification of the mitral valve leaflets. 
 Mild mitral annular calcification. 
 Mild mitral regurgitation. 
 Focal thickening of the aortic valve cusps. 
 No aortic stenosis. 
 Moderate-to-severe tricuspid regurgitation. 
 Right ventricular systolic pressure estimated to be within the  
 normal range at  25-30  mmHg. 
 Small posterior pericardial effusion. 
 Dilated IVC. 
 
 Marshal Mascorro M.D. 
 (Electronically Signed) 
 Final Date:      2018  
                  11:19 
 
 MEASUREMENTS  (Male / Female) Normal Values 
 
 2D ECHO 
 LV Diastolic Diameter PLAX        5.2 cm                4.2 - 5.9 / 3.9 - 5.3 
cm 
 LV Systolic Diameter PLAX         3.9 cm                2.1 - 4.0 cm 
 LV Fractional Shortening PLAX     25.0 %                25 - 46  % 
 LV Ejection Fraction 2D Teich     49.1 %                 
 IVS Diastolic Thickness           1.4 cm                 
 LVPW Diastolic Thickness          1.4 cm                 
 LV Relative Wall Thickness        0.5                    
 RV Internal Dim ED PLAX           3.4 cm                1.9 - 3.8 cm 
 LVOT Diameter                     2.0 cm                 
 Aortic Root Diameter              2.8 cm                 
 LA Systolic Diameter LX           4.3 cm                3.0 - 4.0 / 2.7 - 3.8 
cm 
 LA Volume                         66.0 cm              18 - 58 / 22 - 52 cm 
 Ascending Aorta Diameter          2.7 cm                 
 
 DOPPLER 
 AV Peak Velocity                  178.0 cm/s             
 AV Peak Gradient                  12.7 mmHg              
 AV Mean Velocity                  140.0 cm/s             
 AV Mean Gradient                  9.0 mmHg               
 AV Velocity Time Integral         47.2 cm                
 LVOT Peak Velocity                88.0 cm/s              
 LVOT Peak Gradient                3.1 mmHg               
 LVOT Mean Velocity                59.1 cm/s              
 LVOT Mean Gradient                2.0 mmHg               
 LVOT Velocity Time Integral       18.7 cm                
 LVOT Stroke Volume                58.7 cm               
 AV Area Cont Eq vti               1.2 cm                
 AV Area Cont Eq pk                1.6 cm                
 MV Peak Velocity                  95.0 cm/s              
 MV Peak Gradient                  3.6 mmHg               
 MV Mean Velocity                  50.5 cm/s              
 MV Mean Gradient                  1.0 mmHg               
 Mitral E Point Velocity           72.1 cm/s              
 Mitral A Point Velocity           73.1 cm/s              
 Mitral E to A Ratio               1.0                    
 MV PHT Velocity                   98.2 cm/s              
 MV Deceleration Amelia             347.0 cm/s            
 MV Pressure Half Time             84.9 ms                
 MV Area PHT                       2.6 cm                
 MV Deceleration Time              238.0 ms               
 TR Peak Velocity                  220.0 cm/s             
 TR Peak Gradient                  19.4 mmHg              
 Right Atrial Pressure             10.0 mmHg              
 Pulmonary Artery Systolic Pressu  29.4 mmHg              
 Right Ventricular Systolic Press  29.4 mmHg              
 PV Peak Velocity                  101.0 cm/s             
 PV Peak Gradient                  4.1 mmHg               
 PV Mean Velocity                  59.9 cm/s              
 PV Mean Gradient                  2.0 mmHg               
 PV Velocity Time Integral         20.9 cm                
 LV E' Lateral Velocity            5.9 cm/s               
 Mitral E to LV E' Lateral Ratio   12.3                   
 LV E' Septal Velocity             3.1 cm/s               
 Mitral E to LV E' Septal Ratio    23.2

## 2018-02-08 VITALS — DIASTOLIC BLOOD PRESSURE: 70 MMHG | SYSTOLIC BLOOD PRESSURE: 102 MMHG

## 2018-02-08 VITALS — SYSTOLIC BLOOD PRESSURE: 100 MMHG | DIASTOLIC BLOOD PRESSURE: 60 MMHG

## 2018-02-08 VITALS — DIASTOLIC BLOOD PRESSURE: 60 MMHG | SYSTOLIC BLOOD PRESSURE: 100 MMHG

## 2018-02-08 LAB
ABSOLUTE GRANULOCYTE CT: 1.4 /CUMM (ref 1.4–6.5)
BASOPHILS # BLD: 0 /CUMM (ref 0–0.2)
BASOPHILS NFR BLD: 0.4 % (ref 0–2)
EOSINOPHIL # BLD: 0 /CUMM (ref 0–0.7)
EOSINOPHIL NFR BLD: 1 % (ref 0–5)
ERYTHROCYTE [DISTWIDTH] IN BLOOD BY AUTOMATED COUNT: 17.2 % (ref 11.5–14.5)
GRANULOCYTES NFR BLD: 52.7 % (ref 42.2–75.2)
HCT VFR BLD CALC: 34.6 % (ref 37–47)
LYMPHOCYTES # BLD: 0.7 /CUMM (ref 1.2–3.4)
MCH RBC QN AUTO: 28.5 PG (ref 27–31)
MCHC RBC AUTO-ENTMCNC: 32.2 G/DL (ref 33–37)
MCV RBC AUTO: 88.6 FL (ref 81–99)
MONOCYTES # BLD: 0.5 /CUMM (ref 0.1–0.6)
PMV BLD AUTO: 14.1 FL (ref 7.4–10.4)
RED BLOOD CELL CT: 3.9 /CUMM (ref 4.2–5.4)
WBC # BLD AUTO: 2.6 /CUMM (ref 4.8–10.8)

## 2018-02-08 NOTE — PN- NEPHROLOGY
Assessment/Plan
Assessment:
Doing well. On dialysis now Plans for discharge after dialysis noted.
 
Suggestion:
.
 
 
Subjective
Subjective:
Pt s/p EGD. Biopsies taken pending. Pt on dialysis now
 
Objective
Vital Signs and I&Os
 
Pleasant F NAD
102/70  63  98
Lungs clear
Cor RRR
Abd soft
Ext neg edema
 
 
 
Results
Pertinent Lab Results:
 
137 / 98 / 18 /
3.4  / 24  / 6.3\
 
Hg 11.7 (2/7)

## 2018-02-08 NOTE — DISCHARGE SUMMARY
Visit Information
 
Visit Dates
Admission Date:
02/05/18
 
Discharge Date:
02/08/18
 
 
Hospital Course
 
Course
Attending Physician:
Walter Moffett MD
 
Primary Care Physician:
Ranulfo Rossi MD
 
Hospital Course:
72 Y F, presented with black BM x2 and lightheadedness
 
PMH of ESRD on HD (Tues/Thurs/Sat), HTN, DM, hypothyroidism
 
VS, Ph Ex at admission:
 over 130, IN 98, RR 20, no fever, later /102
Labs at admission:
C BC: Hgb 12.2, WBC 3.8, PLT was not contacted, INR 1.3, BEP, CR 6.5, AST 39, 
troponin 0.25
Imagings at admission:
CXR:
Cardiomegaly and pulmonary venous hypertension.
Low lung volumes with bibasilar atelectasis but no dense focal consolidation.
 
Patient was admitted to telemetry floor for management of following conditions:
 
GIB
Patient had black bowel movement and was admitted to hospital. Likely upper GI 
bleeding considering black bowel movement which indicates presence of blood for 
several hours.  However patient had no hematemesis that is relatively indicative
of upper GI bleeding.  Patient was admitted to telemetry floor. 
 
IV Protonix twice a day was adminsitered initially and was changed to PO 
medication after Endoscopy:
 
Endoscopy:
 1.  Prominent prepyloric fold
 2.  Gastritis with hemorrhagic spots and abnormal vascularity
 Sampeling was done from:
 1.  Prominent prepyloric fold
 2.  Gastritis with hemorrhagic spots and abnormal vascularity
 
PO PPI were administered, diet started and was tolerated. After confirming with 
oncall GI doctor anti Plt treatment with plavix was restarted percardio advice. 
patient was instructed to follow with Dr. Yap as an outpatient for 
pathology review
 
ESRD on HD
Patient has a history of end-stage renal disease on hemodialysis on Tuesday, 
Thursday, Saturday.  We consulted nephrology and continued the same schedule.
-Hemodialysis Tues/Thurs/Sat
 
Increased troponins
This could be secondary to an decisional disease, however we will continue to 
trend down to rule out ACS.  Patient doesn't have any symptoms at this point.
 
Echo:
Left ventricular cavity size at the upper limits of normal. 
 Moderate concentric left ventricular hypertrophy. 
 Mildly reduced global left ventricular systolic function. 
 Left ventricular ejection fraction is estimated at   40-45   %. 
 Abnormal septal motion consistent with left bundle branch block. 
 Mild left atrial dilatation. 
 Mild thickening/calcification of the mitral valve leaflets. 
 Mild mitral annular calcification. 
 Mild mitral regurgitation. 
 Focal thickening of the aortic valve cusps. 
 No aortic stenosis. 
 Moderate-to-severe tricuspid regurgitation. 
 Right ventricular systolic pressure estimated to be within the  
 normal range at  25-30  mmHg. 
 Small posterior pericardial effusion. 
 Dilated IVC. 
 
-Serial EKG and troponin were done and ruled out ACS
-Follow Cardiology consult
- started plavix with PPI
 
Full code
Dialysis diet
DVT PPX: ALPS
Allergies:
Coded Allergies:
No Known Drug Allergies (Intermediate, NONE 11/02/17)
 
Significant Procedures:
Endoscopy as noted above
Pertinent Lab Results:
 Vital Signs
 
 
Date Time Temp Pulse Resp B/P B/P Pulse O2 O2 Flow FiO2
 
     Mean Ox Delivery Rate 
 
02/08 1514  67  100/60     
 
02/08 1514  67  100/60     
 
02/08 1507 97.2 67 20 100/60  97   
 
02/08 0604 98.3 63 20 102/70  95   
 
02/07 2158  65  128/72     
 
02/07 2146 98.3 65 18 128/72  91 Room Air  
 
02/07 2118       Room Air  
 
02/07 1414 98.3 66 20 126/60  93 Room Air  
 
02/07 0753  68  132/76     
 
02/07 0753  68  132/76     
 
02/07 0752  68  132/76     
 
02/07 0639 98.9 69 18 132/76  93 Room Air  
 
02/06 2209 98.9 60 20 118/70  94   
 
02/06 2100  56  124/64     
 
02/06 1623 98.7 56 20 124/64  96 Room Air  
 
02/06 1009  60  120/64     
 
02/06 1008  60  120/64     
 
02/06 1007  60  120/64     
 
02/06 0713 98.2 60 20 120/64  94 Room Air  
 
02/05 2245  96  160/90     
 
02/05 2244  96  160/90     
 
02/05 2244 98.2 96 24 160/90  95   
 
02/05 1802 97.8 87 28 156/78  95   
 
02/05 1733 98.2 96 16 168/88  98 Room Air Room Air 
 
02/05 1548  98 16 170/100  98 Room Air Room Air 
 
02/05 1358 98.1 100 20 172/102  98 Room Air  
 
02/05 1356      96   
 
02/05 1215 98.0 98 20 210/130  89   
 
 
 Laboratory Tests
 
 
 
02/08/18 1210:
 
 
02/08/18 0910:
Estimated GFR 6  L, BUN/Creatinine Ratio 2.9  L
 
02/08/18 0613:
Anion Gap 14, Estimated GFR 7  L, BUN/Creatinine Ratio 2.9  L, CBC w Diff NO MAN
DIFF REQ, RBC 3.90  L, MCV 88.6, MCH 28.5, MCHC 32.2  L, RDW 17.2  H, MPV 14.1  
H, Gran % 52.7, Lymphocytes % 27.3, Monocytes % 18.6  H, Eosinophils % 1.0, 
Basophils % 0.4, Absolute Granulocytes 1.4, Absolute Lymphocytes 0.7  L, 
Absolute Monocytes 0.5, Absolute Eosinophils 0, Absolute Basophils 0
 Orders
 
 
Procedure Date/time Status
 
UREA NITROGEN 02/08 1241 Complete
 
MISSING MEDICATION FORM 02/08 1132 Active
 
BUN & CREATININE 02/08 0848 Complete
 
CBC WITHOUT DIFFERENTIAL 02/08 0600 Complete
 
BASIC ELECTROLYTES PLUS BUN&CR 02/08 0600 Complete
 
Discharge Patient 02/08  UNK Active
 
Anticipated Discharge 02/08  UNK Active
 
Renal Dialysis Diet 02/07 L Active
 
Nothing by Mouth 02/07 B Complete
 
PATHOLOGY SPECIMEN 02/07 0913 Complete
 
CBC WITHOUT DIFFERENTIAL 02/07 0600 Complete
 
BASIC ELECTROLYTES PLUS BUN&CR 02/07 0600 Complete
 
Discontinue Telemetry/Monitor 02/07  UNK Active
 
Renal Dialysis Diet 02/06 D Complete
 
CBC WITHOUT DIFFERENTIAL 02/06 1251 Complete
 
CALCIUM 02/06 1251 Complete
 
BASIC ELECTROLYTES PLUS BUN&CR 02/06 1251 Complete
 
TROPONIN LEVEL 02/06 0500 Complete
 
EKG 02/06 0500 Active
 
 
 
 
Disposition Summary
 
Disposition
Principal Diagnosis:
Erosive gastritis, GI bleeding
Additional Diagnosis:
Elevated troponins, most likely secondary to kidney disease
Discharge Disposition: home or self care
 
Discharge Instructions
 
General Discharge Information
Code Status: Full Code
Patient's Diet:
Renal dialysis
Patient's Activity:
Self limited
Follow-Up Instructions/Appts:
Please follow-up with your PCP within one week of discharge.
 
Please follow with your gastroenterologist within 1 week of discharge to review 
biopsy results.
 
Please follow with your cardiologist within 1 week of discharge for stress test.
 
Please take your medications as ordered.  Please come back to hospital if 
symptoms worsen.
 
Medications at Discharge
Discharge Medications:
Continue taking these medications:
Sertraline HCl (Sertraline HCl) 50 MG TABLET
    1 Tablet ORAL DAILY
    Qty = 30
    Comments:
       Last Taken: 2/8/18
             Time: 3:15 PM
 
Midodrine HCl (Midodrine HCl) 5 MG TABLET
    1 Tablet ORAL  as needed for BP
    Qty = 30
    Comments:
       NOT GIVEN IN HOSPITAL
 
Amlodipine Besylate (Amlodipine Besylate) 5 MG TABLET
    1 Tablet ORAL DAILY
    Qty = 30
    Comments:
       Last Taken: 2/7/18
             Time: 8:00 AM
 
Pravastatin Sodium (Pravastatin Sodium) 40 MG TABLET
    1 Tablet ORAL DAILY
    Qty = 30
    Comments:
       Last Taken: 2/7/18
             Time: 5:00 PM
 
Acetaminophen (Mapap) 325 MG TABLET
    2 Tablet ORAL Q8H as needed for PAIN
    Comments:
       NOT GIVEN IN HOSPITAL
 
Carvedilol (Carvedilol) 25 MG TABLET
    1 Tablet ORAL TWICE DAILY
    Comments:
       Last Taken: 2/8/18
             Time: 3:15 PM
 
Glimepiride (Glimepiride) 1 MG TABLET
    1 Tablet ORAL DAILY BEFORE BREAKFAST
    Comments:
       NOT GIVEN IN HOSPITAL
 
Levothyroxine Sodium (Levothyroxine Sodium) 25 MCG TABLET
    1 Tablet ORAL DAILY
    Comments:
       Last Taken: 2/8/18
             Time: 3:15 PM
 
Losartan Potassium (Cozaar) 50 MG TABLET
    1 Tablet ORAL DAILY
    Comments:
       Last Taken: 2/7/18
             Time: 8:00 AM
 
Melatonin (Melatonin) 10 MG TABLET
    1 Tablet ORAL TAKE AT BEDTIME
    Comments:
       Last Taken: 2/7/18
             Time: 10:00 PM
 
Sevelamer Carbonate (Renvela) 800 MG TABLET
    3 Tablet ORAL TID WM
    Comments:
       Last Taken: 2/8/18
             Time: 3:15 PM
 
Start taking the following new medications:
Clopidogrel Bisulfate (Plavix) 75 MG TABLET
    1 Tablet ORAL DAILY
    Qty = 30
    No Refills
    Instructions:
       Please take once a day
    Comments:
       NOT GIVEN IN HOSPITAL
 
Pantoprazole Sodium (Protonix) 40 MG TABLET.DR
    1 Tablet ORAL DAILY
    Qty = 30
    No Refills
    Instructions:
       Please take once a day
    Comments:
       NOT GIVEN IN HOSPITAL
 
 
Copies To:
Fracisco GONZALES,Jovanna; Enid GONZALES,Ranulfo; Subha GONZALES,Marshal BARTHOLOMEW
 
Attending MD Review Statement
Documenting Attending:
Walter Moffett MD
Other Findings:
The patient was seen and discussed with house staff upon discharge. Will be on 
Protonix and Plavix as per GI/Cardiology. Follow-up with Nephrology and PCP as 
well.

## 2018-02-08 NOTE — PATIENT DISCHARGE INSTRUCTIONS
Discharge Instructions
 
General Discharge Information
You were seen/treated for:
GI bleeding
ESRD on dialysis
Increased TN
You had these procedures:
Upper GI endoscopy
Watch for these problems:
Severe chest pain, shortness of breathing, dizziness, palpitation, heart racing,
change in stool color, nausea or vomiting or worsening of any other symptoms
Special Instructions:
Please follow-up with your PCP within one week of discharge.
 
Please follow with your gastroenterologist within 1 week of discharge to review 
biopsy results.
 
Please follow with your cardiologist within 1 week of discharge for stress test.
 
Please take your medications as ordered.  Please come back to hospital if 
symptoms worsen.
 
Diet
Continue normal diet: No
Recommended Diet: Renal Dialysis
 
Activity
Full Activity/No Limits: No
Activity Self Limited: Yes
 
Acute Coronary Syndrome
 
Inclusion Criteria
At DC or during hospital stay patient has or had the following:
ACS DIAGNOSIS No
 
Discharge Core Measures
Meds if any: Prescribed or Continued at Discharge
Meds if any: NOT Prescribed or Continued at Discharge
 
Congestive Heart Failure
 
Inclusion Criteria
At DC or during hospital stay patient has or had the following:
CHF DIAGNOSIS No
 
Discharge Core Measures
Meds if any: Prescribed or Continued at Discharge
Meds if any: NOT Prescribed or Continued at Discharge
 
Cerebrovascular accident
 
Inclusion Criteria
At DC or during hospital stay patient has or had the following:
CVA/TIA Diagnosis No
 
Discharge Core Measures
Meds if any: Prescribed or Continued at Discharge
Meds if any: NOT Prescribed or Continued at Discharge
 
Venous thromboembolism
 
Inclusion Criteria
VTE Diagnosis No
VTE Type NONE
VTE Confirmed by (Test) NONE
 
Discharge Core Measures
- Per Current guidelines, there needs to be overlap
- treatment for the first 5 days of Warfarin therapy.
- If discharged on Warfarin prior to 5 days of
- overlap therapy, the patient will need to be
- assessed for post discharge needs including
- *Post discharge parental anticoagulation
- *Warfarin and/or parental anticoagulation education
- *Follow up date to check INR post discharge
At least 5 days overlap therapy as Inpatient No
Meds if any: Prescribed or Continued at Discharge
Note: Overlap Therapy is Warfarin and Anticoagulant
Meds if any: NOT Prescribed or Continued at Discharge

## 2018-02-08 NOTE — PN- HOUSESTAFF
**See Addendum**
Subjective
Follow-up For:
GI bleeding
ESRD on dialysis
Increased troponin
Tele-Events Since Last Visit:
off tele
Subjective:
Patient visited today, was lying in bed comfortably in no acute distress, was 
alert and oriented.
 
Had BM today which was non-bloody. 
 
No fever or chills, no shortness of breathing, no chest pain, no other events.
 
Planned to be discharged today if stable after HD. 
 
Dr Yap was on off today, called and left a message. Then called and talked 
with on call gastroenterologist regarding antiplatelet treatment, recommended he
can restart antiplatelet treatment as long as patient is on PPI.  Will plan to 
start upon discharge. 
 
Review of Systems
Constitutional:
Reports: see HPI. 
 
Objective
Last 24 Hrs of Vital Signs/I&O
 Vital Signs
 
 
Date Time Temp Pulse Resp B/P B/P Pulse O2 O2 Flow FiO2
 
     Mean Ox Delivery Rate 
 
604 98.3 63 20 102/70  95   
 
 2158  65  128/72     
 
 2146 98.3 65 18 128/72  91 Room Air  
 
 2118       Room Air  
 
 1414 98.3 66 20 126/60  93 Room Air  
 
 
 Intake & Output
 
 
  1600  0800  0000
 
Intake Total  240 60
 
Output Total   
 
Balance  240 60
 
    
 
Intake, Oral  240 60
 
Patient  177 lb 
 
Weight   
 
Weight  Bed scale 
 
Measurement   
 
Method   
 
 
 
 
Physical Exam
General Appearance: Alert, Oriented X3, Cooperative, No Acute Distress
Skin: No Significant Lesion
Skin Temp/Moisture Exam: Warm/Dry
HEENT: Atraumatic, EOMI, Mucous Membr. moist/pink
Lungs: Clear to Auscultation
Abdomen: Soft, No Tenderness
Neurological: Normal Speech
Extremities: no edema, AV fistula on right arm
Current Medications:
 Current Medications
 
 
  Sig/Misty Start time  Last
 
Medication Dose Route Stop Time Status Admin
 
Acetaminophen 650 MG Q6P PRN  1800 AC 
 
  PO   
 
Amlodipine Besylate 5 MG DAILY  1759 AC 
 
  PO   0752
 
Carvedilol 25 MG BID  220 AC 
 
  PO   2158
 
Epoetin Jw 4,000 UNIT PER PROTOCL PRN  1030 AC 
 
  IV   
 
Insulin Aspart 0 TIDAC  0800 AC 
 
  SC   
 
Insulin Aspart 0 AT BEDTIME  2200 AC 
 
  SC   
 
Insulin Human Regular 0 Q6  2359 DC 
 
  SC   
 
Levothyroxine Sodium 0.025 MG DAILY  1800 AC 
 
  PO   0752
 
Losartan Potassium 50 MG DAILY  1000 AC 
 
  PO   0753
 
Melatonin 10 MG AT BEDTIME  2200 AC 
 
  PO   215
 
Midodrine 5 MG DAILY PRN  1800 AC 
 
  PO   
 
Omeprazole 40 MG DAILY AC  0700 AC 
 
  PO   0629
 
Paricalcitol 1 MCG PER PROTOCL PRN  1030 AC 
 
  IV   
 
Pravastatin Sodium 20 MG 1700  1700 AC 
 
  PO   1707
 
Sertraline HCl 50 MG DAILY  1759 AC 
 
  PO   0753
 
Sevelamer Carbonate 2,400 MG WM  1800 AC 
 
  PO   0833
 
 
 
 
Last 24 Hrs of Lab/Daniel Results
Last 24 Hrs of Labs/Mics:
 Laboratory Tests
 
18 0910:
Estimated GFR 6  L, BUN/Creatinine Ratio 2.9  L
 
18 06:
Anion Gap 14, Estimated GFR 7  L, BUN/Creatinine Ratio 2.9  L, CBC w Diff NO MAN
DIFF REQ, RBC 3.90  L, MCV 88.6, MCH 28.5, MCHC 32.2  L, RDW 17.2  H, MPV 14.1  
H, Gran % 52.7, Lymphocytes % 27.3, Monocytes % 18.6  H, Eosinophils % 1.0, 
Basophils % 0.4, Absolute Granulocytes 1.4, Absolute Lymphocytes 0.7  L, 
Absolute Monocytes 0.5, Absolute Eosinophils 0, Absolute Basophils 0
 
 
Assessment/Plan
Assessment:
72 Y F, presented with black BM x2 and lightheadedness
 
PMH of ESRD on HD (Tues/Thurs/Sat), HTN, DM, hypothyroidism
 
VS, Ph Ex at admission:
 over 130, [place] 98, RR 20, no fever, later /102
Labs at admission:
C BC: Hgb 12.2, WBC 3.8, PLT was not contacted, INR 1.3, BEP, CR 6.5, AST 39, 
troponin 0.25
Imagings at admission:
CXR:
Cardiomegaly and pulmonary venous hypertension.
Low lung volumes with bibasilar atelectasis but no dense focal consolidation.
 
Patient was admitted to telemetry floor for management of following conditions:
 
GIB
Likely upper GI bleeding considering black bowel movement which indicates 
presence of blood for several hours.  However patient had no hematemesis that is
relatively indicative of upper GI bleeding.  At this point the source could be 
upper or lower GI bleeding as clinical presentation doesn't point to the source,
yet upper is more likely as noted above.
We will admit patient to telemetry floor 
 
IV Protonix twice a day was adminsitered initially and was changed to PO 
medication after Endoscopy:
 
Endoscopy:
 1.  Prominent prepyloric fold
 2.  Gastritis with hemorrhagic spots and abnormal vascularity
 [place] was done from:
 1.  Prominent prepyloric fold
 2.  Gastritis with hemorrhagic spots and abnormal vascularity
 
-Continue patient to telemetry floor
-Vital sign, I/O
- resumed diet
- PO protonix
- start anti plt, plavix per GI and Cardio
- follow-up with Dr. Yap as an outpatient for pathology review
 
ESRD on HD
Patient has a history of end-stage renal disease on hemodialysis on Tuesday, 
Thursday, Saturday.  We will consult nephrology and continue the same schedule.
-Nephrology consult
-Hemodialysis Tues/Thurs/Sat
 
Increased troponins
This could be secondary to an decisional disease, however we will continue to 
trend down to rule out ACS.  Patient doesn't have any symptoms at this point.
 
Echo:
Left ventricular cavity size at the upper limits of normal. 
 Moderate concentric left ventricular hypertrophy. 
 Mildly reduced global left ventricular systolic function. 
 Left ventricular ejection fraction is estimated at   40-45   %. 
 Abnormal septal motion consistent with left bundle branch block. 
 Mild left atrial dilatation. 
 Mild thickening/calcification of the mitral valve leaflets. 
 Mild mitral annular calcification. 
 Mild mitral regurgitation. 
 Focal thickening of the aortic valve cusps. 
 No aortic stenosis. 
 Moderate-to-severe tricuspid regurgitation. 
 Right ventricular systolic pressure estimated to be within the  
 normal range at  25-30  mmHg. 
 Small posterior pericardial effusion. 
 Dilated IVC. 
 
-Serial EKG and troponin ruled out ACS
-Follow Cardiology consult
- follow with Plavix
 
Full code
Dialysis diet
DVT PPX: ALPS
 
Patient was planned to be dischared today
Problem List:
 1. Elevated troponin level
 
 2. Erosive gastritis
 
 3. GIB (gastrointestinal bleeding)
 
 4. ESRD (end stage renal disease)
 
Pain Ratin
Pain Location:
none
Pain Goal: Pain 4 or less
Pain Plan:
continue current plan
Tomorrow's Labs & Rationales:
cbc bep